# Patient Record
Sex: FEMALE | Race: WHITE | Employment: UNEMPLOYED | URBAN - METROPOLITAN AREA
[De-identification: names, ages, dates, MRNs, and addresses within clinical notes are randomized per-mention and may not be internally consistent; named-entity substitution may affect disease eponyms.]

---

## 2018-08-13 ENCOUNTER — HOSPITAL ENCOUNTER (OUTPATIENT)
Age: 53
Discharge: HOME | End: 2018-08-13

## 2018-08-13 ENCOUNTER — OFFICE VISIT (OUTPATIENT)
Dept: FAMILY MEDICINE CLINIC | Age: 53
End: 2018-08-13

## 2018-08-13 VITALS
TEMPERATURE: 98.4 F | DIASTOLIC BLOOD PRESSURE: 81 MMHG | SYSTOLIC BLOOD PRESSURE: 126 MMHG | HEART RATE: 68 BPM | BODY MASS INDEX: 24.59 KG/M2 | RESPIRATION RATE: 16 BRPM | HEIGHT: 66 IN | WEIGHT: 153 LBS | OXYGEN SATURATION: 99 %

## 2018-08-13 DIAGNOSIS — M79.672 LEFT FOOT PAIN: Primary | ICD-10-CM

## 2018-08-13 DIAGNOSIS — M79.672 LEFT FOOT PAIN: ICD-10-CM

## 2018-08-13 LAB
CHOLESTEROL, TOTAL: 220 MG/DL (ref 0–199)
HBA1C MFR BLD: 6.4 % (ref 4–5.6)
HDLC SERPL-MCNC: 64 MG/DL
LDL CHOLESTEROL CALCULATED: 141 MG/DL (ref 0–99)
TRIGL SERPL-MCNC: 76 MG/DL (ref 0–149)
TSH SERPL DL<=0.05 MIU/L-ACNC: 3.01 UIU/ML (ref 0.27–4.2)
URIC ACID, SERUM: 5.5 MG/DL (ref 2.4–5.7)
VLDLC SERPL CALC-MCNC: 15 MG/DL

## 2018-08-13 PROCEDURE — 80061 LIPID PANEL: CPT

## 2018-08-13 PROCEDURE — 99213 OFFICE O/P EST LOW 20 MIN: CPT | Performed by: FAMILY MEDICINE

## 2018-08-13 PROCEDURE — 84550 ASSAY OF BLOOD/URIC ACID: CPT

## 2018-08-13 PROCEDURE — 36415 COLL VENOUS BLD VENIPUNCTURE: CPT

## 2018-08-13 PROCEDURE — 84443 ASSAY THYROID STIM HORMONE: CPT

## 2018-08-13 PROCEDURE — 83036 HEMOGLOBIN GLYCOSYLATED A1C: CPT

## 2018-08-13 ASSESSMENT — ENCOUNTER SYMPTOMS
HEARTBURN: 1
BLURRED VISION: 0
CONSTIPATION: 0
COUGH: 0
ABDOMINAL PAIN: 0
DIARRHEA: 0
SORE THROAT: 0
NAUSEA: 0

## 2018-08-13 ASSESSMENT — PATIENT HEALTH QUESTIONNAIRE - PHQ9
SUM OF ALL RESPONSES TO PHQ QUESTIONS 1-9: 0
2. FEELING DOWN, DEPRESSED OR HOPELESS: 0
SUM OF ALL RESPONSES TO PHQ QUESTIONS 1-9: 0
SUM OF ALL RESPONSES TO PHQ QUESTIONS 1-9: 0
SUM OF ALL RESPONSES TO PHQ9 QUESTIONS 1 & 2: 0
2. FEELING DOWN, DEPRESSED OR HOPELESS: 0
1. LITTLE INTEREST OR PLEASURE IN DOING THINGS: 0
SUM OF ALL RESPONSES TO PHQ QUESTIONS 1-9: 0
1. LITTLE INTEREST OR PLEASURE IN DOING THINGS: 0
SUM OF ALL RESPONSES TO PHQ9 QUESTIONS 1 & 2: 0

## 2018-08-13 NOTE — PROGRESS NOTES
SUBJECTIVE:        Patient ID: Bill Duron is a 48 y.o. female who presents for   Chief Complaint   Patient presents with    Annual Exam    Foot Pain     lt foot      Annual Physical  Currently does not have insurance   Came from [place], 4 years ago and never has had any insurance  Unable to do any blood work or procedures Due to financial restrictions  FHx of DM in mother and her family and would like to have her A1c checked today  Also HTN in her dad and his family  Wants to get cholesterol checked as as a history of high cholesterol earlier in life    Left Foot Pain  Left foot 1 st MTP joint   Patient reported that she noticed swelling about 2 months ago which was the 1st time she had a possible gout attack, she stopped eating meat and drank lots of water which helped with reducing the swelling. She has been swelling and pain free for the last 2 weeks  FHx of Gout in father  Denies any fever or chills    Review of Systems   Constitutional: Negative for chills and fever. HENT: Positive for ear pain. Negative for congestion and sore throat. Eyes: Negative for blurred vision. Respiratory: Negative for cough. Gastrointestinal: Positive for heartburn. Negative for abdominal pain, constipation, diarrhea and nausea. Genitourinary: Negative for dysuria and frequency. Neurological: Negative for dizziness and headaches. Psychiatric/Behavioral: Negative for depression, substance abuse and suicidal ideas. The patient is not nervous/anxious. The patient has no complaints, no CP, SOB, N/V/D, headache, dizziness, or vision change. Past Medical History:   Diagnosis Date    Acid reflux     Over 5 years ago       Patient Active Problem List   Diagnosis    Epigastric pain    Screening for breast cancer       No past surgical history on file.     Past Medical History:   Diagnosis Date    Acid reflux     Over 5 years ago       Family History   Problem Relation Age of Onset    No Known schedule as soon as possible if overdue, as this is important in assessing for undiagnosed pathology, especially cancer, as well as protecting against potentially harmful/life threatening disease. Refills as needed    Discussed any signs and symptoms that would warrant immediate ED evaluation    Allyssa was instructed to call if any new symptoms develop prior to next visit.          F/U as instructed    Baron Delong M.D  PGY-3  Family Medicine

## 2018-08-13 NOTE — PROGRESS NOTES
[facility]  Precepting Note    Subjective: Annual check up  Has +FHx DM, HgbA1C- wants checked  ?gout attack- L great toe was swollen; improved with hydration; wanted checked for gout  Has no insurance - looking for cost-effective ways to cover care and HM      ROS otherwise per resident note     Past medical, surgical, family and social history were reviewed, non-contributory, and unchanged unless otherwise stated. Objective:    /81   Pulse 68   Temp 98.4 °F (36.9 °C)   Resp 16   Ht 5' 6\" (1.676 m)   Wt 153 lb (69.4 kg)   SpO2 99%   BMI 24.69 kg/m²     Exam is as noted by resident with the following changes, additions or corrections:    General:  NAD; alert & oriented x 3   Neck Thyroid enlarged   Heart:  RRR, no murmurs, gallops, or rubs. Lungs:  CTA bilaterally, no wheeze, rales or rhonchi  Abd: bowel sounds present, nontender, nondistended, no masses  Extrem:  No clubbing, cyanosis, or edema    Assessment/Plan:  Gout  Annual check    Plan  Labs  Colorectal screening with 3 hemoccult cards  RTO for well female within 6 mo     Attending Physician Statement  I have reviewed the chart, including any radiology or labs. I have discussed the case, including pertinent history and exam findings with the resident. I agree with the assessment, plan and orders as documented by the resident. Please refer to the resident note for additional information.       Electronically signed by Darene Dance, MD on 8/13/2018 at 9:41 AM

## 2018-08-14 ENCOUNTER — TELEPHONE (OUTPATIENT)
Dept: FAMILY MEDICINE CLINIC | Age: 53
End: 2018-08-14

## 2018-08-14 DIAGNOSIS — R73.03 PREDIABETES: ICD-10-CM

## 2018-08-14 NOTE — TELEPHONE ENCOUNTER
The 10-year ASCVD risk score (Loulou Hernandez, et al., 2013) is: 1.5%    Values used to calculate the score:      Age: 48 years      Sex: Female      Is Non- : No      Diabetic: No      Tobacco smoker: No      Systolic Blood Pressure: 867 mmHg      Is BP treated: No      HDL Cholesterol: 64 mg/dL      Total Cholesterol: 220 mg/dL'    Spoke with pt regarding borderline A1c and Lipids  Advised that her risk for heart disease is low  But she has borderline A1c and tot Chol and LDL  She needs to watch her diet, eat more lean meats like chicken and turkey, more vegetables and fruits  Avoid eating out and fried foods  Will recheck in 6 months   Pt demonstrated understanding and agreeable to plan

## 2018-11-21 ENCOUNTER — TELEPHONE (OUTPATIENT)
Dept: FAMILY MEDICINE CLINIC | Age: 53
End: 2018-11-21

## 2018-11-21 DIAGNOSIS — R73.03 PREDIABETES: Primary | ICD-10-CM

## 2018-11-21 DIAGNOSIS — E78.00 ELEVATED CHOLESTEROL: ICD-10-CM

## 2018-11-25 PROBLEM — E78.00 ELEVATED CHOLESTEROL: Status: ACTIVE | Noted: 2018-11-25

## 2019-01-29 ENCOUNTER — HOSPITAL ENCOUNTER (OUTPATIENT)
Age: 54
Discharge: HOME | End: 2019-01-29

## 2019-01-29 DIAGNOSIS — E78.00 ELEVATED CHOLESTEROL: ICD-10-CM

## 2019-01-29 LAB
CHOLESTEROL, TOTAL: 186 MG/DL (ref 0–199)
HDLC SERPL-MCNC: 60 MG/DL
LDL CHOLESTEROL CALCULATED: 115 MG/DL (ref 0–99)
TRIGL SERPL-MCNC: 53 MG/DL (ref 0–149)
VLDLC SERPL CALC-MCNC: 11 MG/DL

## 2019-01-29 PROCEDURE — 80061 LIPID PANEL: CPT

## 2019-01-29 PROCEDURE — 36415 COLL VENOUS BLD VENIPUNCTURE: CPT

## 2019-02-08 ENCOUNTER — OFFICE VISIT (OUTPATIENT)
Dept: FAMILY MEDICINE CLINIC | Age: 54
End: 2019-02-08

## 2019-02-08 ENCOUNTER — HOSPITAL ENCOUNTER (OUTPATIENT)
Age: 54
Discharge: HOME | End: 2019-02-10

## 2019-02-08 VITALS
OXYGEN SATURATION: 98 % | BODY MASS INDEX: 24.59 KG/M2 | HEIGHT: 66 IN | HEART RATE: 73 BPM | DIASTOLIC BLOOD PRESSURE: 72 MMHG | SYSTOLIC BLOOD PRESSURE: 117 MMHG | RESPIRATION RATE: 18 BRPM | WEIGHT: 153 LBS

## 2019-02-08 DIAGNOSIS — Z01.419 WELL WOMAN EXAM: Primary | ICD-10-CM

## 2019-02-08 DIAGNOSIS — Z12.39 BREAST CANCER SCREENING: ICD-10-CM

## 2019-02-08 DIAGNOSIS — R73.03 PREDIABETES: ICD-10-CM

## 2019-02-08 LAB — HBA1C MFR BLD: 5.9 %

## 2019-02-08 PROCEDURE — 99396 PREV VISIT EST AGE 40-64: CPT | Performed by: FAMILY MEDICINE

## 2019-02-08 PROCEDURE — 83036 HEMOGLOBIN GLYCOSYLATED A1C: CPT | Performed by: FAMILY MEDICINE

## 2019-02-08 PROCEDURE — 88175 CYTOPATH C/V AUTO FLUID REDO: CPT

## 2019-02-08 PROCEDURE — 87624 HPV HI-RISK TYP POOLED RSLT: CPT

## 2019-02-08 ASSESSMENT — PATIENT HEALTH QUESTIONNAIRE - PHQ9
1. LITTLE INTEREST OR PLEASURE IN DOING THINGS: 0
SUM OF ALL RESPONSES TO PHQ QUESTIONS 1-9: 0
SUM OF ALL RESPONSES TO PHQ QUESTIONS 1-9: 0
2. FEELING DOWN, DEPRESSED OR HOPELESS: 0
SUM OF ALL RESPONSES TO PHQ9 QUESTIONS 1 & 2: 0

## 2019-02-14 LAB
CORRESPONDING PAP CASE #: NORMAL
HPV, HIGH RISK: NEGATIVE

## 2019-09-13 ENCOUNTER — TELEPHONE (OUTPATIENT)
Dept: ADMINISTRATIVE | Age: 54
End: 2019-09-13

## 2019-09-19 ENCOUNTER — TELEPHONE (OUTPATIENT)
Dept: ADMINISTRATIVE | Age: 54
End: 2019-09-19

## 2019-09-24 ENCOUNTER — OFFICE VISIT (OUTPATIENT)
Dept: FAMILY MEDICINE CLINIC | Age: 54
End: 2019-09-24

## 2019-09-24 VITALS
DIASTOLIC BLOOD PRESSURE: 80 MMHG | RESPIRATION RATE: 16 BRPM | SYSTOLIC BLOOD PRESSURE: 137 MMHG | HEART RATE: 71 BPM | BODY MASS INDEX: 22.5 KG/M2 | HEIGHT: 66 IN | WEIGHT: 140 LBS

## 2019-09-24 DIAGNOSIS — R73.03 PREDIABETES: Primary | ICD-10-CM

## 2019-09-24 DIAGNOSIS — Z12.11 COLON CANCER SCREENING: ICD-10-CM

## 2019-09-24 DIAGNOSIS — E78.00 HYPERCHOLESTEREMIA: ICD-10-CM

## 2019-09-24 DIAGNOSIS — Z12.39 BREAST CANCER SCREENING: ICD-10-CM

## 2019-09-24 LAB — HBA1C MFR BLD: 6.1 %

## 2019-09-24 PROCEDURE — 99213 OFFICE O/P EST LOW 20 MIN: CPT | Performed by: FAMILY MEDICINE

## 2019-09-24 PROCEDURE — 83036 HEMOGLOBIN GLYCOSYLATED A1C: CPT | Performed by: FAMILY MEDICINE

## 2020-07-08 ENCOUNTER — HOSPITAL ENCOUNTER (OUTPATIENT)
Age: 55
Discharge: HOME | End: 2020-07-10

## 2020-07-08 LAB
CHOLESTEROL, TOTAL: 196 MG/DL (ref 0–199)
HDLC SERPL-MCNC: 68 MG/DL
LDL CHOLESTEROL CALCULATED: 117 MG/DL (ref 0–99)
TRIGL SERPL-MCNC: 55 MG/DL (ref 0–149)
VLDLC SERPL CALC-MCNC: 11 MG/DL

## 2020-07-08 PROCEDURE — 80061 LIPID PANEL: CPT

## 2020-09-10 ENCOUNTER — OFFICE VISIT (OUTPATIENT)
Dept: FAMILY MEDICINE CLINIC | Age: 55
End: 2020-09-10

## 2020-09-10 ENCOUNTER — HOSPITAL ENCOUNTER (OUTPATIENT)
Age: 55
Discharge: HOME | End: 2020-09-12

## 2020-09-10 VITALS
RESPIRATION RATE: 16 BRPM | HEART RATE: 74 BPM | BODY MASS INDEX: 22.66 KG/M2 | HEIGHT: 66 IN | WEIGHT: 141 LBS | OXYGEN SATURATION: 97 % | TEMPERATURE: 97.8 F | SYSTOLIC BLOOD PRESSURE: 128 MMHG | DIASTOLIC BLOOD PRESSURE: 68 MMHG

## 2020-09-10 PROBLEM — K21.9 GASTROESOPHAGEAL REFLUX DISEASE: Status: ACTIVE | Noted: 2020-09-10

## 2020-09-10 PROBLEM — M25.512 SHOULDER PAIN, LEFT: Status: ACTIVE | Noted: 2020-07-01

## 2020-09-10 PROBLEM — M79.676 PAIN OF GREAT TOE: Status: ACTIVE | Noted: 2020-09-10

## 2020-09-10 PROBLEM — R73.02 IMPAIRED GLUCOSE TOLERANCE: Status: ACTIVE | Noted: 2020-09-10

## 2020-09-10 PROBLEM — M25.512 LEFT SHOULDER PAIN: Status: ACTIVE | Noted: 2020-09-10

## 2020-09-10 LAB — HBA1C MFR BLD: 6 %

## 2020-09-10 PROCEDURE — 85025 COMPLETE CBC W/AUTO DIFF WBC: CPT

## 2020-09-10 PROCEDURE — 99214 OFFICE O/P EST MOD 30 MIN: CPT | Performed by: FAMILY MEDICINE

## 2020-09-10 PROCEDURE — 83036 HEMOGLOBIN GLYCOSYLATED A1C: CPT | Performed by: FAMILY MEDICINE

## 2020-09-10 PROCEDURE — 84550 ASSAY OF BLOOD/URIC ACID: CPT

## 2020-09-10 RX ORDER — IBUPROFEN 200 MG
200 TABLET ORAL EVERY 6 HOURS PRN
Qty: 30 TABLET | Refills: 1 | Status: SHIPPED
Start: 2020-09-10 | End: 2022-08-10

## 2020-09-10 RX ORDER — CIMETIDINE 800 MG
800 TABLET ORAL PRN
Qty: 30 TABLET | Refills: 1 | Status: SHIPPED
Start: 2020-09-10 | End: 2022-05-12

## 2020-09-10 RX ORDER — COLCHICINE 0.6 MG/1
TABLET ORAL
Qty: 9 TABLET | Refills: 1 | Status: SHIPPED
Start: 2020-09-10 | End: 2022-05-12

## 2020-09-10 ASSESSMENT — ENCOUNTER SYMPTOMS
DIARRHEA: 0
SORE THROAT: 0
ABDOMINAL PAIN: 0
VOMITING: 0
SHORTNESS OF BREATH: 0
NAUSEA: 0
CONSTIPATION: 0
EYE PAIN: 0
WHEEZING: 0
BLOOD IN STOOL: 0
TROUBLE SWALLOWING: 0

## 2020-09-10 ASSESSMENT — PATIENT HEALTH QUESTIONNAIRE - PHQ9
1. LITTLE INTEREST OR PLEASURE IN DOING THINGS: 0
SUM OF ALL RESPONSES TO PHQ QUESTIONS 1-9: 0
SUM OF ALL RESPONSES TO PHQ9 QUESTIONS 1 & 2: 0
2. FEELING DOWN, DEPRESSED OR HOPELESS: 0
SUM OF ALL RESPONSES TO PHQ QUESTIONS 1-9: 0

## 2020-09-10 NOTE — PROGRESS NOTES
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 Objective:    /68   Pulse 74   Temp 97.8 °F (36.6 °C) (Temporal)   Resp 16   Ht 5' 6\" (1.676 m)   Wt 141 lb (64 kg)   SpO2 97%   BMI 22.76 kg/m²     Exam is as noted by resident with the following changes, additions or corrections:    General:  NAD; alert & oriented x 3   Heart:  RRR, no murmurs, gallops, or rubs. Lungs:  CTA bilaterally, no wheeze, rales or rhonchi  Abd: bowel sounds present, nontender, nondistended, no masses  Extrem:  No clubbing, cyanosis, or edema  MS: L shoulder with intact ROM, no impingement     Assessment/Plan:    GERD   H2 blocker   IGT   A1C remains acceptable   Work on diet  Toe pain   Uric acid     Shoulder pain   Stretching exercises given       Attending Physician Statement  I have reviewed the chart, including any radiology or labs, and have seen the patient with the resident(s). I personally reviewed and performed key elements of the history and exam.  I agree with the assessment, plan and orders as documented by the resident. Please refer to the resident note for additional information.       Electronically signed by Keturah Parkinson MD on 9/10/2020 at 2:40 PM

## 2020-09-10 NOTE — PROGRESS NOTES
[facility]  Department of Family Medicine  Family Medicine Residency Program      Patient:  Madison Sheffield 54 y.o. female     Date of Service: 9/10/20      Chief complaint:   Chief Complaint   Patient presents with    [facility]    Diabetes     Prediabetes          History ofPresent Illness   Madison Sheffield is a 54 y.o. female who presents to the clinic with complaints as above. Impaired Glucose Tolerance  She has had it for several years  Her last A1C was 6.1 in 2019  Patient would like to have her A1C: today it was 6.0    Great toe pain  Family history of gout in her father  Patient has pain in her left big toe and it's joint every so often, most recently about a week ago  Her right great toe joint sometimes hurts too  The pain lasts about a day or two then goes away  The pain is achy  Her joint swells up and turns red, is very painful to move  Nothing makes it better  Eating a lot of meat makes it worse  Her last uric acid was 5.5; will repeat today    GERD  She has pain in her stomach and under her sternum a few times a week  This has been happening for many years  It hurts for a few minutes to hours and goes away  It is a burning pain  She is unsure of what makes it worse  Tums makes it better  She would like to try a PRN medication for heartburn     Shoulder Pain  For the past two months  Her left shoulder hurts  She is right handed  It hurts for a few hours every few days  It is an achy pain  It is worse when she works a lot at [employer]  She sleeps on her left side  Empty can sign negative  No pain with ROM and no point tenderness  Recommended rest, ibuprofen, gave shoulder exercises; if pain persists, will work up for arthritis    Past Medical History:      Diagnosis Date    Acid reflux     Over 5 years ago    Prediabetes 8/14/2018       Past Surgical History:    No past surgical history on file. Allergies:    Patient has no known allergies.     Social History: Social History     Socioeconomic History    Marital status:      Spouse name: Not on file    Number of children: Not on file    Years of education: Not on file    Highest education level: Not on file   Occupational History    Not on file   Social Needs    Financial resource strain: Not on file    Food insecurity     Worry: Not on file     Inability: Not on file    Transportation needs     Medical: Not on file     Non-medical: Not on file   Tobacco Use    Smoking status: Never Smoker    Smokeless tobacco: Never Used   Substance and Sexual Activity    Alcohol use: No    Drug use: No    Sexual activity: Yes     Partners: Male   Lifestyle    Physical activity     Days per week: Not on file     Minutes per session: Not on file    Stress: Not on file   Relationships    Social connections     Talks on phone: Not on file     Gets together: Not on file     Attends Sabianist service: Not on file     Active member of club or organization: Not on file     Attends meetings of clubs or organizations: Not on file     Relationship status: Not on file    Intimate partner violence     Fear of current or ex partner: Not on file     Emotionally abused: Not on file     Physically abused: Not on file     Forced sexual activity: Not on file   Other Topics Concern    Not on file   Social History Narrative    Not on file        Family History:       Problem Relation Age of Onset    No Known Problems Mother     No Known Problems Father        Medication List:    Current Outpatient Medications   Medication Sig Dispense Refill    colchicine (COLCRYS) 0.6 MG tablet Take two tablets by mouth, then take one tablet by mouth 2 hours after first two tablets. Limit intake to three tablets per episode of great toe pain.  9 tablet 1    cimetidine (TAGAMET) 800 MG tablet Take 1 tablet by mouth as needed (heartburn) 30 tablet 1    ibuprofen (ADVIL) 200 MG tablet Take 1 tablet by mouth every 6 hours as needed for Pain 30 tablet 1     No current facility-administered medications for this visit. Review of Systems:   Review of Systems   Constitutional: Negative for chills and fatigue. HENT: Negative for congestion, sore throat and trouble swallowing. Eyes: Negative for pain and visual disturbance. Respiratory: Negative for shortness of breath and wheezing. Cardiovascular: Negative for chest pain and leg swelling. Gastrointestinal: Negative for abdominal pain, blood in stool, constipation, diarrhea, nausea and vomiting. Genitourinary: Negative for dysuria and hematuria. Musculoskeletal: Positive for arthralgias (per HPI, also mildly in bilateral elbows and fingers). Negative for myalgias. Neurological: Positive for dizziness (if she doesn't sleep well). Negative for headaches. Psychiatric/Behavioral: Negative for dysphoric mood. The patient is not nervous/anxious. Physical Exam   Vitals: /68   Pulse 74   Temp 97.8 °F (36.6 °C) (Temporal)   Resp 16   Ht 5' 6\" (1.676 m)   Wt 141 lb (64 kg)   SpO2 97%   BMI 22.76 kg/m²   Physical Exam  Constitutional:       General: She is not in acute distress. Appearance: Normal appearance. She is normal weight. HENT:      Right Ear: External ear normal.      Left Ear: External ear normal.   Eyes:      General: No scleral icterus. Right eye: No discharge. Left eye: No discharge. Neck:      Musculoskeletal: Normal range of motion. No neck rigidity. Cardiovascular:      Rate and Rhythm: Normal rate and regular rhythm. Heart sounds: Normal heart sounds. No murmur. Pulmonary:      Effort: Pulmonary effort is normal.      Breath sounds: Normal breath sounds. No wheezing. Abdominal:      General: Abdomen is flat. Bowel sounds are normal.      Palpations: Abdomen is soft. Tenderness: There is no abdominal tenderness. Musculoskeletal: Normal range of motion. General: No tenderness. Right lower leg: No edema. Left lower leg: No edema. Comments: Small area of redness without increased warmth or pain on medial aspect of left great toe joint   Skin:     General: Skin is warm and dry. Comments: Vitiligo noted on bilateral arms, legs, left foot, face, and base of anterior neck   Neurological:      General: No focal deficit present. Mental Status: She is alert and oriented to person, place, and time. Motor: No weakness. Assessment and Plan     1. Pain of great toe, unspecified laterality  Left greater than right  Last flare was one week ago, swelling/redness/pain  Uric acid drawn today, patient given standing order to return during a flare  Colchicine prescription given today for treatment during flare, will start preventative medication if she has another flare this year or today's uric acid is elevated  - URIC ACID; Future  - colchicine (COLCRYS) 0.6 MG tablet; Take two tablets by mouth, then take one tablet by mouth 2 hours after first two tablets. Limit intake to three tablets per episode of great toe pain. Dispense: 9 tablet; Refill: 1  - URIC ACID; Standing    2. Impaired glucose tolerance  A1C in 2019 was 6.1, today was 6.0  Will start medication and change diagnosis if A1C elevates in future  Patient educated on diet and exercise today  - CBC WITH AUTO DIFFERENTIAL; Future    3. Gastroesophageal reflux disease, esophagitis presence not specified  Several times per week  Does not want to take a medication every day for her heartburn  Will try PRN medication  Educated on trigger foods  CBC to check for possible complications, low clinical suspicion  - cimetidine (TAGAMET) 800 MG tablet; Take 1 tablet by mouth as needed (heartburn)  Dispense: 30 tablet; Refill: 1  - CBC WITH AUTO DIFFERENTIAL; Future    4.  Left shoulder pain, unspecified chronicity  Two months, achy, sleeps on her left  No point tenderness or pain with ROM, empty can test negative  Recommended rest, ibuprofen PRN, gave

## 2020-09-10 NOTE — PATIENT INSTRUCTIONS
Patient Education        Shoulder Arthritis: Exercises  Introduction  Here are some examples of exercises for you to try. The exercises may be suggested for a condition or for rehabilitation. Start each exercise slowly. Ease off the exercises if you start to have pain. You will be told when to start these exercises and which ones will work best for you. How to do the exercises  Shoulder flexion (lying down)   To make a wand for this exercise, use a piece of PVC pipe or a broom handle with the broom removed. Make the wand about a foot wider than your shoulders. 1. Lie on your back, holding a wand with both hands. Your palms should face down as you hold the wand. 2. Keeping your elbows straight, slowly raise your arms over your head. Raise them until you feel a stretch in your shoulders, upper back, and chest.  3. Hold for 15 to 30 seconds. 4. Repeat 2 to 4 times. Shoulder rotation (lying down)   To make a wand for this exercise, use a piece of PVC pipe or a broom handle with the broom removed. Make the wand about a foot wider than your shoulders. 1. Lie on your back. Hold a wand with both hands with your elbows bent and palms up. 2. Keep your elbows close to your body, and move the wand across your body toward the sore arm. 3. Hold for 8 to 12 seconds. 4. Repeat 2 to 4 times. Shoulder internal rotation with towel   1. Hold a towel above and behind your head with the arm that is not sore. 2. With your sore arm, reach behind your back and grasp the towel. 3. With the arm above your head, pull the towel upward. Do this until you feel a stretch on the front and outside of your sore shoulder. 4. Hold 15 to 30 seconds. 5. Repeat 2 to 4 times. Shoulder blade squeeze   1. Stand with your arms at your sides, and squeeze your shoulder blades together. Do not raise your shoulders up as you squeeze. 2. Hold 6 seconds. 3. Repeat 8 to 12 times.     Resisted rows   For this exercise, you will need elastic exercise material, such as surgical tubing or Thera-Band. 1. Put the band around a solid object at about waist level. (A bedpost will work well.) Each hand should hold an end of the band. 2. With your elbows at your sides and bent to 90 degrees, pull the band back. Your shoulder blades should move toward each other. Return to the starting position. 3. Repeat 8 to 12 times. External rotator strengthening exercise   1. Start by tying a piece of elastic exercise material to a doorknob. You can use surgical tubing or Thera-Band. (You may also hold one end of the band in each hand.)  2. Stand or sit with your shoulder relaxed and your elbow bent 90 degrees. Your upper arm should rest comfortably against your side. Squeeze a rolled towel between your elbow and your body for comfort. This will help keep your arm at your side. 3. Hold one end of the elastic band with the hand of the painful arm. 4. Start with your forearm across your belly. Slowly rotate the forearm out away from your body. Keep your elbow and upper arm tucked against the towel roll or the side of your body until you begin to feel tightness in your shoulder. Slowly move your arm back to where you started. 5. Repeat 8 to 12 times. Internal rotator strengthening exercise   1. Start by tying a piece of elastic exercise material to a doorknob. You can use surgical tubing or Thera-Band. 2. Stand or sit with your shoulder relaxed and your elbow bent 90 degrees. Your upper arm should rest comfortably against your side. Squeeze a rolled towel between your elbow and your body for comfort. This will help keep your arm at your side. 3. Hold one end of the elastic band in the hand of the painful arm. 4. Slowly rotate your forearm toward your body until it touches your belly. Slowly move it back to where you started. 5. Keep your elbow and upper arm firmly tucked against the towel roll or at your side. 6. Repeat 8 to 12 times.     Pendulum swing   If you have pain in your back, do not do this exercise. 1. Hold on to a table or the back of a chair with your good arm. Then bend forward a little and let your sore arm hang straight down. This exercise does not use the arm muscles. Rather, use your legs and your hips to create movement that makes your arm swing freely. 2. Use the movement from your hips and legs to guide the slightly swinging arm back and forth like a pendulum (or elephant trunk). Then guide it in circles that start small (about the size of a dinner plate). Make the circles a bit larger each day, as your pain allows. 3. Do this exercise for 5 minutes, 5 to 7 times each day. 4. As you have less pain, try bending over a little farther to do this exercise. This will increase the amount of movement at your shoulder. Follow-up care is a key part of your treatment and safety. Be sure to make and go to all appointments, and call your doctor if you are having problems. It's also a good idea to know your test results and keep a list of the medicines you take. Where can you learn more? Go to https://[employer]. org and sign in to your [employer] account. Enter H562 in the [employer] box to learn more about \"Shoulder Arthritis: Exercises. \"     If you do not have an account, please click on the \"Sign Up Now\" link. Current as of: March 2, 2020               Content Version: 12.5  © 1057-4199 [employer], [employer]. Care instructions adapted under license by [facility] ([facility]). If you have questions about a medical condition or this instruction, always ask your healthcare professional. [place] [address] any warranty or liability for your use of this information. Purse String (Intermediate) Text: Given the location of the defect and the characteristics of the surrounding skin a purse string intermediate closure was deemed most appropriate.  Undermining was performed circumfirentially around the surgical defect.  A purse string suture was then placed and tightened.

## 2020-09-11 LAB
BASOPHILS ABSOLUTE: 0.03 E9/L (ref 0–0.2)
BASOPHILS RELATIVE PERCENT: 0.9 % (ref 0–2)
EOSINOPHILS ABSOLUTE: 0.05 E9/L (ref 0.05–0.5)
EOSINOPHILS RELATIVE PERCENT: 1.4 % (ref 0–6)
HCT VFR BLD CALC: 42.1 % (ref 34–48)
HEMOGLOBIN: 13.3 G/DL (ref 11.5–15.5)
IMMATURE GRANULOCYTES #: 0.01 E9/L
IMMATURE GRANULOCYTES %: 0.3 % (ref 0–5)
LYMPHOCYTES ABSOLUTE: 1.36 E9/L (ref 1.5–4)
LYMPHOCYTES RELATIVE PERCENT: 39.2 % (ref 20–42)
MCH RBC QN AUTO: 28.6 PG (ref 26–35)
MCHC RBC AUTO-ENTMCNC: 31.6 % (ref 32–34.5)
MCV RBC AUTO: 90.5 FL (ref 80–99.9)
MONOCYTES ABSOLUTE: 0.28 E9/L (ref 0.1–0.95)
MONOCYTES RELATIVE PERCENT: 8.1 % (ref 2–12)
NEUTROPHILS ABSOLUTE: 1.74 E9/L (ref 1.8–7.3)
NEUTROPHILS RELATIVE PERCENT: 50.1 % (ref 43–80)
PDW BLD-RTO: 13.2 FL (ref 11.5–15)
PLATELET # BLD: 199 E9/L (ref 130–450)
PMV BLD AUTO: 12.3 FL (ref 7–12)
RBC # BLD: 4.65 E12/L (ref 3.5–5.5)
URIC ACID, SERUM: 4.5 MG/DL (ref 2.4–5.7)
WBC # BLD: 3.5 E9/L (ref 4.5–11.5)

## 2021-11-16 ENCOUNTER — OFFICE VISIT (OUTPATIENT)
Dept: FAMILY MEDICINE CLINIC | Age: 56
End: 2021-11-16

## 2021-11-16 VITALS
TEMPERATURE: 97.5 F | WEIGHT: 143.6 LBS | HEIGHT: 66 IN | BODY MASS INDEX: 23.08 KG/M2 | HEART RATE: 71 BPM | OXYGEN SATURATION: 99 % | DIASTOLIC BLOOD PRESSURE: 80 MMHG | SYSTOLIC BLOOD PRESSURE: 133 MMHG

## 2021-11-16 DIAGNOSIS — R73.03 PREDIABETES: ICD-10-CM

## 2021-11-16 DIAGNOSIS — M25.512 CHRONIC LEFT SHOULDER PAIN: ICD-10-CM

## 2021-11-16 DIAGNOSIS — G44.209 ACUTE NON INTRACTABLE TENSION-TYPE HEADACHE: Primary | ICD-10-CM

## 2021-11-16 DIAGNOSIS — H11.32 SUBCONJUNCTIVAL HEMATOMA, LEFT: ICD-10-CM

## 2021-11-16 DIAGNOSIS — G89.29 CHRONIC LEFT SHOULDER PAIN: ICD-10-CM

## 2021-11-16 LAB
ANION GAP SERPL CALCULATED.3IONS-SCNC: 17 MMOL/L (ref 7–16)
BASOPHILS ABSOLUTE: 0.04 E9/L (ref 0–0.2)
BASOPHILS RELATIVE PERCENT: 0.9 % (ref 0–2)
BUN BLDV-MCNC: 13 MG/DL (ref 6–20)
CALCIUM SERPL-MCNC: 10 MG/DL (ref 8.6–10.2)
CHLORIDE BLD-SCNC: 105 MMOL/L (ref 98–107)
CHOLESTEROL, TOTAL: 237 MG/DL (ref 0–199)
CO2: 21 MMOL/L (ref 22–29)
CREAT SERPL-MCNC: 0.8 MG/DL (ref 0.5–1)
EOSINOPHILS ABSOLUTE: 0.12 E9/L (ref 0.05–0.5)
EOSINOPHILS RELATIVE PERCENT: 2.7 % (ref 0–6)
GFR AFRICAN AMERICAN: >60
GFR NON-AFRICAN AMERICAN: >60 ML/MIN/1.73
GLUCOSE BLD-MCNC: 118 MG/DL (ref 74–99)
HBA1C MFR BLD: 6.1 % (ref 4–5.6)
HCT VFR BLD CALC: 45.4 % (ref 34–48)
HDLC SERPL-MCNC: 63 MG/DL
HEMOGLOBIN: 14.1 G/DL (ref 11.5–15.5)
IMMATURE GRANULOCYTES #: 0.02 E9/L
IMMATURE GRANULOCYTES %: 0.5 % (ref 0–5)
LDL CHOLESTEROL CALCULATED: 161 MG/DL (ref 0–99)
LYMPHOCYTES ABSOLUTE: 1.33 E9/L (ref 1.5–4)
LYMPHOCYTES RELATIVE PERCENT: 30.2 % (ref 20–42)
MCH RBC QN AUTO: 28.4 PG (ref 26–35)
MCHC RBC AUTO-ENTMCNC: 31.1 % (ref 32–34.5)
MCV RBC AUTO: 91.5 FL (ref 80–99.9)
MONOCYTES ABSOLUTE: 0.28 E9/L (ref 0.1–0.95)
MONOCYTES RELATIVE PERCENT: 6.3 % (ref 2–12)
NEUTROPHILS ABSOLUTE: 2.62 E9/L (ref 1.8–7.3)
NEUTROPHILS RELATIVE PERCENT: 59.4 % (ref 43–80)
PDW BLD-RTO: 13 FL (ref 11.5–15)
PLATELET # BLD: 266 E9/L (ref 130–450)
PMV BLD AUTO: 12.1 FL (ref 7–12)
POTASSIUM SERPL-SCNC: 4.8 MMOL/L (ref 3.5–5)
RBC # BLD: 4.96 E12/L (ref 3.5–5.5)
SODIUM BLD-SCNC: 143 MMOL/L (ref 132–146)
TRIGL SERPL-MCNC: 65 MG/DL (ref 0–149)
VLDLC SERPL CALC-MCNC: 13 MG/DL
WBC # BLD: 4.4 E9/L (ref 4.5–11.5)

## 2021-11-16 PROCEDURE — 99213 OFFICE O/P EST LOW 20 MIN: CPT | Performed by: STUDENT IN AN ORGANIZED HEALTH CARE EDUCATION/TRAINING PROGRAM

## 2021-11-16 ASSESSMENT — ENCOUNTER SYMPTOMS
VOMITING: 0
COUGH: 0
SINUS PAIN: 0
CHEST TIGHTNESS: 0
NAUSEA: 0
SORE THROAT: 0
SHORTNESS OF BREATH: 0
EYE PAIN: 0
EYE REDNESS: 1
ABDOMINAL PAIN: 0
WHEEZING: 0

## 2021-11-16 ASSESSMENT — PATIENT HEALTH QUESTIONNAIRE - PHQ9
SUM OF ALL RESPONSES TO PHQ9 QUESTIONS 1 & 2: 0
1. LITTLE INTEREST OR PLEASURE IN DOING THINGS: 0
SUM OF ALL RESPONSES TO PHQ QUESTIONS 1-9: 0
2. FEELING DOWN, DEPRESSED OR HOPELESS: 0
SUM OF ALL RESPONSES TO PHQ QUESTIONS 1-9: 0
SUM OF ALL RESPONSES TO PHQ QUESTIONS 1-9: 0

## 2021-11-16 NOTE — PROGRESS NOTES
including any radiology or labs, and have seen the patient with the resident(s). I personally reviewed and performed key elements of the history and exam.  I agree with the assessment, plan and orders as documented by the resident. Please refer to the resident note for additional information.       Electronically signed by Cora Dooley MD on 11/16/2021 at 11:00 AM

## 2021-11-16 NOTE — PATIENT INSTRUCTIONS
FOR HEADACHE:  Excedrin or Ibuprofen  Increase water  Frequent meals                Patient Education        Shoulder Arthritis: Exercises  Introduction  Here are some examples of exercises for you to try. The exercises may be suggested for a condition or for rehabilitation. Start each exercise slowly. Ease off the exercises if you start to have pain. You will be told when to start these exercises and which ones will work best for you. How to do the exercises  Shoulder flexion (lying down)    To make a wand for this exercise, use a piece of PVC pipe or a broom handle with the broom removed. Make the wand about a foot wider than your shoulders. 1. Lie on your back, holding a wand with both hands. Your palms should face down as you hold the wand. 2. Keeping your elbows straight, slowly raise your arms over your head. Raise them until you feel a stretch in your shoulders, upper back, and chest.  3. Hold for 15 to 30 seconds. 4. Repeat 2 to 4 times. Shoulder rotation (lying down)    To make a wand for this exercise, use a piece of PVC pipe or a broom handle with the broom removed. Make the wand about a foot wider than your shoulders. 1. Lie on your back. Hold a wand with both hands with your elbows bent and palms up. 2. Keep your elbows close to your body, and move the wand across your body toward the sore arm. 3. Hold for 8 to 12 seconds. 4. Repeat 2 to 4 times. Shoulder internal rotation with towel    1. Hold a towel above and behind your head with the arm that is not sore. 2. With your sore arm, reach behind your back and grasp the towel. 3. With the arm above your head, pull the towel upward. Do this until you feel a stretch on the front and outside of your sore shoulder. 4. Hold 15 to 30 seconds. 5. Repeat 2 to 4 times. Shoulder blade squeeze    1. Stand with your arms at your sides, and squeeze your shoulder blades together. Do not raise your shoulders up as you squeeze.   2. Hold 6 seconds. 3. Repeat 8 to 12 times. Resisted rows    For this exercise, you will need elastic exercise material, such as surgical tubing or Thera-Band. 1. Put the band around a solid object at about waist level. (A bedpost will work well.) Each hand should hold an end of the band. 2. With your elbows at your sides and bent to 90 degrees, pull the band back. Your shoulder blades should move toward each other. Return to the starting position. 3. Repeat 8 to 12 times. External rotator strengthening exercise    1. Start by tying a piece of elastic exercise material to a doorknob. You can use surgical tubing or Thera-Band. (You may also hold one end of the band in each hand.)  2. Stand or sit with your shoulder relaxed and your elbow bent 90 degrees. Your upper arm should rest comfortably against your side. Squeeze a rolled towel between your elbow and your body for comfort. This will help keep your arm at your side. 3. Hold one end of the elastic band with the hand of the painful arm. 4. Start with your forearm across your belly. Slowly rotate the forearm out away from your body. Keep your elbow and upper arm tucked against the towel roll or the side of your body until you begin to feel tightness in your shoulder. Slowly move your arm back to where you started. 5. Repeat 8 to 12 times. Internal rotator strengthening exercise    1. Start by tying a piece of elastic exercise material to a doorknob. You can use surgical tubing or Thera-Band. 2. Stand or sit with your shoulder relaxed and your elbow bent 90 degrees. Your upper arm should rest comfortably against your side. Squeeze a rolled towel between your elbow and your body for comfort. This will help keep your arm at your side. 3. Hold one end of the elastic band in the hand of the painful arm. 4. Slowly rotate your forearm toward your body until it touches your belly. Slowly move it back to where you started.   5. Keep your elbow and upper arm firmly tucked against the towel roll or at your side. 6. Repeat 8 to 12 times. Pendulum swing    If you have pain in your back, do not do this exercise. 1. Hold on to a table or the back of a chair with your good arm. Then bend forward a little and let your sore arm hang straight down. This exercise does not use the arm muscles. Rather, use your legs and your hips to create movement that makes your arm swing freely. 2. Use the movement from your hips and legs to guide the slightly swinging arm back and forth like a pendulum (or elephant trunk). Then guide it in circles that start small (about the size of a dinner plate). Make the circles a bit larger each day, as your pain allows. 3. Do this exercise for 5 minutes, 5 to 7 times each day. 4. As you have less pain, try bending over a little farther to do this exercise. This will increase the amount of movement at your shoulder. Follow-up care is a key part of your treatment and safety. Be sure to make and go to all appointments, and call your doctor if you are having problems. It's also a good idea to know your test results and keep a list of the medicines you take. Where can you learn more? Go to [email or web address]://[email or web address].[email or web address] Piece. org and sign in to your [employer] account. Enter H562 in the VisualOn box to learn more about \"Shoulder Arthritis: Exercises. \"     If you do not have an account, please click on the \"Sign Up Now\" link. Current as of: July 1, 2021               Content Version: 13.0  © 2006-2021 Healthwise, Incorporated. Care instructions adapted under license by [facility] ([place] [facility]). If you have questions about a medical condition or this instruction, always ask your healthcare professional. [place] [address] any warranty or liability for your use of this information. Patient Education       ÇáßÝÉ ÇáãÏæÑÉ: ÊãÇÑíä  Rotator Cuff: Exercises  ÃÑÌÍÉ ÇáÈäÏæá    1. ãáÇÍÙÉ: ÅÐÇ ßÇä áÏíß Ãáã Ýí ÇáÙåÑ¡ ÝáÇ ÊÄÏö åÐÇ ÇáÊãÑíä. 2.  ÃãÓß ÇáßÊÝíä ÃßËÑ¡ ÞÏ ÊÍÊÇÌ Åáì ÇáÊÑÇÌÚ ÎØæÉ Ãæ ÎØæÊíä Åáì ÇáÎáÝ. 4. ÇËÈÊ ãä 15 Åáì 30 ËÇäíÉ Úáì ÇáÃÞá. æÈÚÏ áß ÞÝ æÇÓÊÑÍ. ÅÐÇ ÊÑÇÌÚÊ Åáì ÇáÎáÝ ÃËäÇÁ ZCWTYOM¡ ÝÊÞÏã Åáì ÇáÃãÇã ÍÊì ÊÊãßä ãä ÅÈÞÇÁ ÇáíÏíä Úáì ÇáÓØÍ ÇáÕáÈ. 5. ßÑÑ Ðáß ãÑÊíä Åáì 4 ãÑÇÊ. Ëäí ÇáßÊÝ (ãä æÖÚ ÇáÑÞæÏ)    1. ãáÇÍÙÉ: áÕäÇÚÉ ÚÕÇ ÇáÊãÑíä¡ ÇÓÊÎÏã ÞØÚÉ ãä ÃäÈæÈ ÈáÇÓÊíßí Ãæ ÚÕÇ ÇáãßäÓÉ ÈÚÏ ÅÒÇáÉ ÇáãßäÓÉ. ÇÌÚá ÇáÚÕÇ ÃÚÑÖ ãä ÇáßÊÝíä ÈäÍæ ÞÏã. 2. ÇÓÊáÞ Úáì ÇáÙåÑ¡ æÇÍãá ÇáÚÕÇ ÈíÏíß ßáÊíåãÇ. æíÌÈ Ãä ÊÊÌå YZCHBRYC Åáì NPTGTN ÃËäÇÁ Íãá ÇáÚÕÇ. 3. æÃËäÇÁ ÇáÍÝÇÙ Úáì ÇáãÑÝÞíä Ýí æÖÚ ãÓÊÞíã¡ ÇÑÝÚ ÇáÐÑÇÚíä ÈÈØÁ ÃÚáì ÇáÑÃÓ. NWGLUNRF Åáì Ãä ÊÔÚÑ ÈÇáÔÏø Ýí ÇáßÊÝíä RZMCU ÇáÙåÑ æÇáÕÏÑ. 4. ÇÈÞó åßÐÇ áãÏÉ 15 Åáì 30 ËÇäíÉ. 5. ßÑÑ Ðáß ãÑÊíä Åáì 4 ãÑÇÊ. ÅÏÇÑÉ ÇáßÊÝ (ãä æÖÚ ÇáÑÞæÏ)    1. ãáÇÍÙÉ: áÕäÇÚÉ ÚÕÇ ÇáÊãÑíä¡ ÇÓÊÎÏã ÞØÚÉ ãä ÃäÈæÈ ÈáÇÓÊíßí Ãæ ÚÕÇ ÇáãßäÓÉ ÈÚÏ ÅÒÇáÉ ÇáãßäÓÉ. ÇÌÚá ÇáÚÕÇ ÃÚÑÖ ãä ÇáßÊÝíä ÈäÍæ ÞÏã. 2. ÇÑÞÏ Úáì ÙåÑß. æÇÍãá ÇáÚÕÇ ÈÇáíÏíä ßáÊíåãÇ ãÚ Ëäí ÇáãÑÝÞíä æÇÊÌÇå [id number] Åáì ÇáÃÚáì. 3. ÇÌÚá ÇáãÑÝÞíä ÞÑíÈíä ãä ÇáÌÓã æÍÑøß ÇáÚÕÇ ÈÚÑÖ ÇáÌÓã Ýí ÇÊÌÇå ÇáÐÑÇÚ ÇáãÕÇÈ. 4. ÇÓÊãÑ Ýí åÐÇ ÇáæÖÚ áãÏÉ 8 Åáì 12 ËÇäíÉ. 5. ßÑÑ Ðáß ãÑÊíä Åáì 4 ãÑÇÊ. ÊÓáÞ ÇáÌÏÇÑ (Åáì ÇáÌÇäÈ)    1. ãáÇÍÙÉ: ÊÌäøÈ Ãíø ÍÑßÉ Êßæä ãÓÊÞíãÉ ÈÇáäÓÈÉ áÌÇäÈß¡ æÇäÊÈå Åáì ÚÏã ÊÞæøÓ ÇáÙåÑ. æíÌÈ Ãä íÓÊÞÑ ÇáÐÑÇÚ Úáì 30 ÏÑÌÉ ÈÇáäÓÈÉ áãÞÏãÉ ÇáÌäÈ. 2. ÞÝ ÈÍíË íßæä ÌÇäÈß Ýí ÇÊÌÇå ÇáÍÇÆØ ÈÍíË íãßä ááÃÕÇÈÚ áãÓå Ýí ÒÇæíÉ 30 ÏÑÌÉ ÈÇáäÓÈÉ ááÌÇäÈ ÇáÃãÇãí ãä ÇáÌÓã. 3. ÍÑøß ÃÕÇÈÚ ÇáÐÑÇÚ ÇáãÕÇÈ Åáì ÇáÃÚáì Úáì ÇáÌÏÇÑ ÈÇáÞÏÑ ÇáÐí íÓãÍ Èå ÇáÃáã. ÍÇæá ÚÏã ÊÍÑíß ÇáßÊÝ Åáì ÇáÃÚáì Ýí ÇÊÌÇå ÇáÃÐä ÃËäÇÁ ÊÍÑíß ÇáÐÑÇÚ Åáì ÇáÃÚáì. 4. ÇËÈÊ Ýí åÐÇ ÇáæÖÚ ÍÊì íäÊåí ÇáÚÏø ãä 15 Åáì 20 ÚÏøÉ Úáì ÇáÃÞá. 5. ÍÑøß ÃÕÇÈÚß Åáì ÇáÃÓÝá Åáì ÇáæÖÚ ÇáãÓÊÞíã. 6. ßÑøÑ ÇáÊãÑíä ãä 2 Åáì 4 ãÑÇÊ Úáì ÇáÃÞá. æÍÇæá Ãä ÊÕá Åáì äÞØÉ ÃÚáì Ýí ßá ãÑÉ. ÊÓáÞ ÇáÌÏÇÑ (Åáì ÇáÃãÇã)    1. ãáÇÍÙÉ: ÃËäÇÁ ÊãÑíä ÇáÅØÇáÉ åÐÇ¡ ÇäÊÈå Åáì ÚÏã ÊÞæøÓ ÇáÙåÑ. 2. ÞÝ Ýí ãæÇÌåÉ ÇáÌÏÇÑ¡ æÞÝ ÈÚíÏðÇ Úäå ÈÍíË íãßä áÃÕÇÈÚß ÝÍÓÈ áãÓå.   3. ÇÌÚá ÇáßÊÝ ãÓÊÞÑðÇ Ýí SWRMVC¡ æÍÑøß ÇáãØÇØÉ ÈãÞÈÖ ÇáÈÇÈ. æíãßäß ÇÓÊÎÏÇã ÃäÈæÈ ÇáÌÑÇÍÉ Ãæ ÔÑíØ Thera-Band. 2. ÞÝ Ãæ ÇÌáÓ ãÚ ÇÓÊÑÎÇÁ ÇáßÊÝíä æËäí ÇáãÑÝÞíä Úáì ÒÇæíÉ 90 ÏÑÌÉ. æíäÈÛí ÇÓÊÑÎÇÁ ÇáÚÖÏ ÈÔßá ãÑíÍ ãÞÇÈá ÌÇäÈß. ÇÖÛØ Úáì ãäÔÝÉ ãáÝæÝÉ Èíä ÇáãÑÝÞ æÇáÌÓÏ ááÍÕæá Úáì ÇáÑÇÍÉ. ÝåÐÇ íÌÚá ÇáÐÑÇÚ Ýí ãæÖÚå Úáì ÇáÌÇäÈ. 3. ÃãÓß ÈÃÍÏ ØÑÝí ÇáÔÑíØ ÇáãØÇØ ãÓÊÎÏãðÇ íÏ ÇáÐÑÇÚ ÇáãÕÇÈ. 4. æÈÈØÁ ÃÏÑ ÇáÓÇÚÏ Ýí ÇÊÌÇå ÇáÌÓã Åáì Ãä íáãÓ ÇáÈØä. æÈÚÏ áß ÑÏøå Åáì ÍíË ÈÏÃÊ. 5. ÍÇÝÙ Úáì ÈÞÇÁ ÇáãÑÝÞ æÇáÚÖÏ ËÇÈÊíä IPIIYT Úáì ÇáãäÔÝÉ QTXIGVPV Ãæ ÌÇäÈ ÇáÌÓã. 6. ßÑøÑ ÇáÊãÑíä ãä 8 ãÑÇÊ Åáì 12 ãÑÉ. ÊãÑíä ÇáÊÞæíÉ ÈÇáÏæÑÇä ÇáÎÇÑÌí    1. ÇÈÏÃ ÈÑÈØ ÞØÚÉ ãä ãÇÏÉ ÇáÊãÑíä ÇáãØÇØÉ ÈãÞÈÖ ÇáÈÇÈ. æíãßäß ÇÓÊÎÏÇã ÃäÈæÈ ÇáÌÑÇÍÉ Ãæ ÔÑíØ Thera-Band. (íãßäß ÃíÖðÇ ÅãÓÇß ßá ØÑÝ ãä ØÑÝí ÇáÔÑíØ Ýí ßá íÏ.)  2. ÞÝ Ãæ ÇÌáÓ ãÚ ÇÓÊÑÎÇÁ ÇáßÊÝíä æËäí ÇáãÑÝÞíä Úáì ÒÇæíÉ 90 ÏÑÌÉ. æíäÈÛí ÇÓÊÑÎÇÁ ÇáÚÖÏ ÈÔßá ãÑíÍ ãÞÇÈá ÌÇäÈß. ÇÖÛØ Úáì ãäÔÝÉ ãáÝæÝÉ Èíä ÇáãÑÝÞ æÇáÌÓÏ ááÍÕæá Úáì ÇáÑÇÍÉ. ÝåÐÇ íÌÚá ÇáÐÑÇÚ Ýí ãæÖÚå Úáì ÇáÌÇäÈ. 3. ÃãÓß ÈÃÍÏ ØÑÝí ÇáÔÑíØ ÇáãØÇØ ÈíÏ ÇáÐÑÇÚ ÇáãÕÇÈ. 4. ÇÈÏÃ ÈæÖÚ ÇáÓÇÚÏ ÈÇáÚÑÖ Úáì ÇáÈØä. æÈÈØÁ ÃÏÑ ÇáÓÇÚÏ Åáì ÇáÎÇÑÌ ÈÚíÏðÇ Úä ÇáÌÓã. ÍÇÝÙ Úáì ÈÞÇÁ ÇáãÑÝÞ æÇáÚÖÏ ËÇÈÊðÇ Úáì ãäÔÝÉ ãáÝæÝÉ Ãæ ÌÇäÈ ÇáÌÓã Åáì Ãä ÊÈÏÃ ZZYXSAM ÈÇáÔÏø Ýí ÇáßÊÝ. æÈÚÏ Ðáß ÍÑøß ÐÑÇÚß Åáì ÍíË ÈÏÃÊ. 5. ßÑøÑ ÇáÊãÑíä ãä 8 ãÑÇÊ Åáì 12 ãÑÉ. ÊõÚÏ ÑÚÇíÉ ÇáãÊÇÈÚÉ ÌÒÁðÇ ÃÓÇÓíðÇ Ýí ÚáÇÌß æÓáÇãÊß. ÝÚáíß ÇáÍÑÕ Úáì ÊÑÊíÈ ÌãíÚ ãæÇÚíÏ ÒíÇÑÉ ÇáØÈíÈ ZVSYVNUDE ÈåÇ¡ FVVBIETU ÈØÈíÈß ÚäÏ JVQCTOEF ãä Ãí ãÔßáÇÊ. æãä ÇáÌíÏ ÃíÖðÇ Ãä ÊÚÑÝ äÊÇÆÌ YZMBXVRS æßÐáß SEZNDHYR ÈÞÇÆãÉ ÇáÃÏæíÉ ÇáÊí KFEPHDBS.  Ãíä íãßäß ãÚÑÝÉ ÇáãÒíÏ¿  ÇäÊÞÇá Åáì   [email or web address]  ÃÏÎá Nuris Morita Ýí ãÑÈÚ ÇáÈÍË áãÚÑÝÉ ÇáãÒíÏ Íæá \"ÇáßÝÉ ÇáãÏæÑÉ: ÊãÇÑíä. \"  Deitra Glass [id number] ãä: 1 ÊãæÒ 1558               äÓÎÉ MVVEINR: 13.0  © 3075-1694 Healthwise, Incorporated. Êã ÊÚÏíá ÅÑÔÇÏÇÊ ÇáÑÚÇíÉ ÈãæÌÈ ÊÑÎíÕ ÕÇÏÑ ãä ÇÎÊÕÇÕí ÇáÑÚÇíÉ ÇáÕÍíÉ ÇáÎÇÕ Èß. ÅÐÇ ßÇäÊ áÏíß ÃÓÆáÉ MYWFI ÈÍÇáÉ ãÑÖíÉ Ãæ ÈåÐå ÇáÊÚáíãÇÊ¡ ÝÇÍÑÕ Úáì ÇáÑÌæÚ ÏÇÆãðÇ Åáì ÇÎÊÕÇÕí ÇáÑÚÇíÉ ÇáÕÍíÉ.  ÊõÎáí ÔÑßÉ Gerri Vitale GLYOVQOFJ Jenny Big ÖãÇä Ãæ ÇáÊBalbir Gomez FIFKGTHWE áåÐå [id number].

## 2021-11-16 NOTE — PROGRESS NOTES
[address] [facility]      Department of Family Medicine  Family Medicine Residency  Phone: [phone]   Fax: [phone]    11/16/21    Liam Thakur is a 64 y.o. female presenting to the outpatient clinic for:  Chief Complaint   Patient presents with    Blood Pressure Check     pt states it's been running high.   She is also having headaches everyday however not all day       :Patient refused  x3  Daughter was present during exam  Patient spoke in [place] during the exam as well  Used teach back to assess understanding    HPI:    Headaches  -5 days  -All day but intensity varies  -Top of head  -Feels like tightening on top of head  -Advil twice but did not help  -Makes eyes feel heavy  -No hx migraines  -Denies nausea, vomiting, photophobia, phonophobia  -Denies head injury  -Denies slurred vision or weakness  -Nothing makes better   -Feels it is from BP (no hx htn)  -BP readings from home all WNL    Red eye  -Left eye  -5 days  -Improving over time  -No pain  -No drainage  -Had URI symptoms 1 month ago  -Used OTC eyedrops twice and slightly improved redness  -Denies vision changes  -Denies use of contacts  -Denies injury    Left Shoulder pain  -In just shoulder, no radiation  -With movement  -x6 months  -Comes and goes  -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  -Working exacerbates   -No injury she can remember   -Similar pain occurred with right shoulder that improved with PT    Prediabetes  -Denies any urinary symptoms  -Denies symptoms of hypo or hyperglycemia  -Patient requests blood work today    BP Readings from Last 3 Encounters:   11/16/21 133/80   09/10/20 128/68   09/24/19 137/80      No Known Allergies    Past Medical & Surgical History:      Diagnosis Date    GERD (gastroesophageal reflux disease)     Over 5 years ago    Gout     several years    Impaired glucose tolerance 08/14/2018    Shoulder pain, left 07/2020    Vitiligo 6+ years arms, legs, left foot, neck, face     No past surgical history on file. Family History:      Problem Relation Age of Onset    No Known Problems Mother     Other Father         gout       Social History:  Social History     Tobacco Use    Smoking status: Never Smoker    Smokeless tobacco: Never Used   Substance Use Topics    Alcohol use: No         There is no immunization history on file for this patient. Internal Administration   First Dose      Second Dose           Last COVID Lab No results found for: SARS-COV-2, SARS-COV-2 RNA, SARS-COV-2, SARS-COV-2, SARS-COV-2 BY PCR, SARS-COV-2, SARS-COV-2, SARS-COV-2         Review of Systems:  Review of Systems   Constitutional: Negative for chills, fatigue and fever. HENT: Negative for congestion, postnasal drip, sinus pain, sneezing and sore throat. Eyes: Positive for redness (left eye). Negative for pain and visual disturbance. Respiratory: Negative for cough, chest tightness, shortness of breath and wheezing. Cardiovascular: Negative for chest pain, palpitations and leg swelling. Gastrointestinal: Negative for abdominal pain, nausea and vomiting. Genitourinary: Negative for frequency, hematuria and urgency. Musculoskeletal: Positive for arthralgias. Negative for joint swelling. Skin: Negative for rash and wound. Neurological: Negative for dizziness, syncope, light-headedness and numbness. Physical Exam:  VS:  /80   Pulse 71   Temp 97.5 °F (36.4 °C) (Temporal)   Ht 5' 6\" (1.676 m)   Wt 143 lb 9.6 oz (65.1 kg)   LMP 12/20/2016 (Approximate)   SpO2 99%   BMI 23.18 kg/m²     Physical Exam  Vitals and nursing note reviewed. Constitutional:       General: She is not in acute distress. Appearance: Normal appearance. She is not ill-appearing. HENT:      Head: Normocephalic and atraumatic. Right Ear: External ear normal.      Left Ear: External ear normal.      Nose: Nose normal. No congestion.       Mouth/Throat: Mouth: Mucous membranes are moist.      Pharynx: Oropharynx is clear. Eyes:      General:         Right eye: No discharge. Left eye: No discharge. Conjunctiva/sclera:      Left eye: Hemorrhage (healing) present. Cardiovascular:      Rate and Rhythm: Normal rate and regular rhythm. Heart sounds: No murmur heard. No friction rub. Pulmonary:      Effort: No respiratory distress. Chest:      Chest wall: No tenderness. Abdominal:      General: Abdomen is flat. Palpations: Abdomen is soft. There is no mass. Tenderness: There is no abdominal tenderness. Musculoskeletal:      Cervical back: Neck supple. No tenderness. Right lower leg: No edema. Left lower leg: No edema. Lymphadenopathy:      Cervical: No cervical adenopathy. Skin:     Findings: No erythema or rash. Neurological:      General: No focal deficit present. Mental Status: She is alert. Mental status is at baseline. Psychiatric:         Mood and Affect: Mood normal.         Thought Content: Thought content normal.         Assessment/Plan:  1. Acute non intractable tension-type headache  -Advised to regularly take execedrin/advil/ibuprofen/otc pain med for one week  -Advised to increase water intake  -Advised to have small frequent meals  -Advised to go to ED if symptoms worsen or change (slurred speech, changes in strength, worst headache of life, etc)  -Will F/U in 1 month to reassess    2. Subconjunctival hematoma, left  -Healing  -Advised patient to continue to monitor and told patient it is in healing phase and will heal on its own over time    3. Chronic left shoulder pain  -Patient initially wanted PT but unsure of cost (uninsured)  -Printed off shoulder exercised for home  -Advised to try rest, heat, or cold   -Will reassess in 1 month to see if exercises helped    4. Prediabetes  - LIPID PANEL; Future  - CBC WITH AUTO DIFFERENTIAL; Future  - BASIC METABOLIC PANEL;  Future  - HEMOGLOBIN A1C; episode of great toe pain. (Patient not taking: Reported on 11/16/2021) 9 tablet 1    cimetidine (TAGAMET) 800 MG tablet Take 1 tablet by mouth as needed (heartburn) (Patient not taking: Reported on 11/16/2021) 30 tablet 1     No current facility-administered medications for this visit. Alba Mitchell D.O.   Family Medicine   PGY-1

## 2022-05-12 ENCOUNTER — OFFICE VISIT (OUTPATIENT)
Dept: FAMILY MEDICINE CLINIC | Age: 57
End: 2022-05-12

## 2022-05-12 VITALS
BODY MASS INDEX: 23.11 KG/M2 | HEIGHT: 66 IN | OXYGEN SATURATION: 98 % | TEMPERATURE: 97.4 F | HEART RATE: 68 BPM | SYSTOLIC BLOOD PRESSURE: 131 MMHG | DIASTOLIC BLOOD PRESSURE: 69 MMHG | WEIGHT: 143.8 LBS

## 2022-05-12 DIAGNOSIS — R73.03 PREDIABETES: ICD-10-CM

## 2022-05-12 DIAGNOSIS — R03.0 ELEVATED BP WITHOUT DIAGNOSIS OF HYPERTENSION: ICD-10-CM

## 2022-05-12 DIAGNOSIS — E78.5 HYPERLIPIDEMIA, UNSPECIFIED HYPERLIPIDEMIA TYPE: ICD-10-CM

## 2022-05-12 DIAGNOSIS — Z12.31 ENCOUNTER FOR SCREENING MAMMOGRAM FOR MALIGNANT NEOPLASM OF BREAST: ICD-10-CM

## 2022-05-12 DIAGNOSIS — G44.229 CHRONIC TENSION-TYPE HEADACHE, NOT INTRACTABLE: ICD-10-CM

## 2022-05-12 LAB
ALBUMIN SERPL-MCNC: 4.9 G/DL (ref 3.5–5.2)
ALP BLD-CCNC: 60 U/L (ref 35–104)
ALT SERPL-CCNC: 16 U/L (ref 0–32)
ANION GAP SERPL CALCULATED.3IONS-SCNC: 14 MMOL/L (ref 7–16)
AST SERPL-CCNC: 23 U/L (ref 0–31)
BASOPHILS ABSOLUTE: 0.04 E9/L (ref 0–0.2)
BASOPHILS RELATIVE PERCENT: 0.8 % (ref 0–2)
BILIRUB SERPL-MCNC: 0.7 MG/DL (ref 0–1.2)
BUN BLDV-MCNC: 14 MG/DL (ref 6–20)
CALCIUM SERPL-MCNC: 9.9 MG/DL (ref 8.6–10.2)
CHLORIDE BLD-SCNC: 104 MMOL/L (ref 98–107)
CHOLESTEROL, TOTAL: 228 MG/DL (ref 0–199)
CO2: 23 MMOL/L (ref 22–29)
CREAT SERPL-MCNC: 0.9 MG/DL (ref 0.5–1)
EOSINOPHILS ABSOLUTE: 0.07 E9/L (ref 0.05–0.5)
EOSINOPHILS RELATIVE PERCENT: 1.4 % (ref 0–6)
GFR AFRICAN AMERICAN: >60
GFR NON-AFRICAN AMERICAN: >60 ML/MIN/1.73
GLUCOSE BLD-MCNC: 97 MG/DL (ref 74–99)
HBA1C MFR BLD: 6 % (ref 4–5.6)
HCT VFR BLD CALC: 42.8 % (ref 34–48)
HDLC SERPL-MCNC: 70 MG/DL
HEMOGLOBIN: 13.6 G/DL (ref 11.5–15.5)
IMMATURE GRANULOCYTES #: 0.01 E9/L
IMMATURE GRANULOCYTES %: 0.2 % (ref 0–5)
LDL CHOLESTEROL CALCULATED: 150 MG/DL (ref 0–99)
LYMPHOCYTES ABSOLUTE: 1.58 E9/L (ref 1.5–4)
LYMPHOCYTES RELATIVE PERCENT: 31.9 % (ref 20–42)
MCH RBC QN AUTO: 28.2 PG (ref 26–35)
MCHC RBC AUTO-ENTMCNC: 31.8 % (ref 32–34.5)
MCV RBC AUTO: 88.6 FL (ref 80–99.9)
MONOCYTES ABSOLUTE: 0.37 E9/L (ref 0.1–0.95)
MONOCYTES RELATIVE PERCENT: 7.5 % (ref 2–12)
NEUTROPHILS ABSOLUTE: 2.89 E9/L (ref 1.8–7.3)
NEUTROPHILS RELATIVE PERCENT: 58.2 % (ref 43–80)
PDW BLD-RTO: 13.3 FL (ref 11.5–15)
PLATELET # BLD: 237 E9/L (ref 130–450)
PMV BLD AUTO: 12.2 FL (ref 7–12)
POTASSIUM SERPL-SCNC: 4.8 MMOL/L (ref 3.5–5)
RBC # BLD: 4.83 E12/L (ref 3.5–5.5)
SODIUM BLD-SCNC: 141 MMOL/L (ref 132–146)
TOTAL PROTEIN: 7.6 G/DL (ref 6.4–8.3)
TRIGL SERPL-MCNC: 39 MG/DL (ref 0–149)
VLDLC SERPL CALC-MCNC: 8 MG/DL
WBC # BLD: 5 E9/L (ref 4.5–11.5)

## 2022-05-12 PROCEDURE — 99213 OFFICE O/P EST LOW 20 MIN: CPT | Performed by: FAMILY MEDICINE

## 2022-05-12 RX ORDER — NORTRIPTYLINE HYDROCHLORIDE 10 MG/1
10 CAPSULE ORAL NIGHTLY
Qty: 30 CAPSULE | Refills: 1 | Status: SHIPPED
Start: 2022-05-12 | End: 2022-08-10

## 2022-05-12 SDOH — ECONOMIC STABILITY: FOOD INSECURITY: WITHIN THE PAST 12 MONTHS, THE FOOD YOU BOUGHT JUST DIDN'T LAST AND YOU DIDN'T HAVE MONEY TO GET MORE.: NEVER TRUE

## 2022-05-12 SDOH — ECONOMIC STABILITY: FOOD INSECURITY: WITHIN THE PAST 12 MONTHS, YOU WORRIED THAT YOUR FOOD WOULD RUN OUT BEFORE YOU GOT MONEY TO BUY MORE.: NEVER TRUE

## 2022-05-12 ASSESSMENT — ENCOUNTER SYMPTOMS
DIARRHEA: 0
VOMITING: 0
TROUBLE SWALLOWING: 0
COUGH: 0
NAUSEA: 0
CONSTIPATION: 0
WHEEZING: 0
SHORTNESS OF BREATH: 0
SORE THROAT: 0

## 2022-05-12 ASSESSMENT — SOCIAL DETERMINANTS OF HEALTH (SDOH): HOW HARD IS IT FOR YOU TO PAY FOR THE VERY BASICS LIKE FOOD, HOUSING, MEDICAL CARE, AND HEATING?: NOT HARD AT ALL

## 2022-05-12 NOTE — PATIENT INSTRUCTIONS
When headaches start take Tylenol and Ibuprofen together    Start taking Nortriptyline nightly. In a few weeks ok to increase to 2 tablets.     Follow up with Dr. Rayne Skelton

## 2022-05-12 NOTE — PROGRESS NOTES
S: 64 y.o. female with   Chief Complaint   Patient presents with    Headache     for the last 7 to 10 days pt states that she has been having her b/p go up and down. Headaches have been off and on        Pt has been checking her BP at home and she had one elevated reading. She has also been having her headaches again. They have become more frequent. No aura. She has a hx of preDM. O: VS:  height is 5' 6\" (1.676 m) and weight is 143 lb 12.8 oz (65.2 kg). Her temporal temperature is 97.4 °F (36.3 °C). Her blood pressure is 131/69 and her pulse is 68. Her oxygen saturation is 98%. BP Readings from Last 3 Encounters:   05/12/22 131/69   11/16/21 133/80   09/10/20 128/68     See resident note      Impression/Plan:   1) headaches - start low dose nortriptyline. 2) blood pressure - pt reassured. 3) predM - labs ordered today  4) lipids - check labs today. 5) prev - mammo ordered. Health Maintenance Due   Topic Date Due    COVID-19 Vaccine (1) Never done    HIV screen  Never done    Hepatitis C screen  Never done    DTaP/Tdap/Td vaccine (1 - Tdap) Never done    Colorectal Cancer Screen  Never done    Shingles vaccine (1 of 2) Never done    Breast cancer screen  11/18/2018         Attending Physician Statement  I have discussed the case, including pertinent history and exam findings with the resident. I agree with the documented assessment and plan.       Jennifer Brown MD

## 2022-05-12 NOTE — PROGRESS NOTES
5/13/2022    Gilbert Youssef is a 64 y.o. female here for:    HPI:    Blood Pressure: Has had some elevated readings at home. No history of HTN. Has some headaches at baseline. And although the headaches have become more frequently they do not seem to be specifically tied to the blood pressure. She brought a list of home blood pressure readings all of which are in normal range except for one has an elevated systolic blood pressure up to 147. Blood pressure normal in office today. Headaches: Has been trialing OTC medications. This has been providing mild relief. She gets tension type headaches with a wrap around the side of her head mostly on the right side of the back of her head. She does do her best to stay hydrated. Not having typical aura. No significant neck pain with this. No vision changes or unilateral neurological problems. Prediabetes: Has not seen PCP since establishing with her. Wants to have labs for prediabetes and hyperlipidemia checked. She has been paying more attention to her diet lately. BP Readings from Last 3 Encounters:   05/12/22 131/69   11/16/21 133/80   09/10/20 128/68     Current Outpatient Medications   Medication Sig Dispense Refill    nortriptyline (PAMELOR) 10 MG capsule Take 1 capsule by mouth nightly 30 capsule 1    ibuprofen (ADVIL) 200 MG tablet Take 1 tablet by mouth every 6 hours as needed for Pain 30 tablet 1     No current facility-administered medications for this visit. No Known Allergies    Past Medical & Surgical History:      Diagnosis Date    GERD (gastroesophageal reflux disease)     Over 5 years ago    Gout     several years    Impaired glucose tolerance 08/14/2018    Shoulder pain, left 07/2020    Vitiligo 6+ years    arms, legs, left foot, neck, face     No past surgical history on file.     Family History:      Problem Relation Age of Onset    No Known Problems Mother     Other Father         gout       Social History:  Social History     Tobacco Use    Smoking status: Never Smoker    Smokeless tobacco: Never Used   Substance Use Topics    Alcohol use: No         There is no immunization history on file for this patient. Review of Systems   Constitutional: Negative for chills and fever. HENT: Negative for sore throat and trouble swallowing. Respiratory: Negative for cough, shortness of breath and wheezing. Cardiovascular: Negative for chest pain, palpitations and leg swelling. Gastrointestinal: Negative for constipation, diarrhea, nausea and vomiting. Genitourinary: Negative for dysuria and hematuria. Neurological: Negative for light-headedness and headaches. VS:  /69   Pulse 68   Temp 97.4 °F (36.3 °C) (Temporal)   Ht 5' 6\" (1.676 m)   Wt 143 lb 12.8 oz (65.2 kg)   LMP 12/20/2016 (Approximate)   SpO2 98%   BMI 23.21 kg/m²     Physical Exam  Vitals reviewed. Constitutional:       General: She is not in acute distress. Appearance: Normal appearance. She is not ill-appearing. HENT:      Head: Normocephalic and atraumatic. Cardiovascular:      Rate and Rhythm: Normal rate and regular rhythm. Pulses: Normal pulses. Heart sounds: Normal heart sounds. No murmur heard. Pulmonary:      Effort: Pulmonary effort is normal. No respiratory distress. Breath sounds: No wheezing, rhonchi or rales. Musculoskeletal:         General: Normal range of motion. Cervical back: Normal range of motion. No signs of trauma or crepitus. No pain with movement or muscular tenderness. Normal range of motion. Right lower leg: No edema. Left lower leg: No edema. Skin:     General: Skin is warm and dry. Neurological:      General: No focal deficit present. Mental Status: She is alert and oriented to person, place, and time. Cranial Nerves: No cranial nerve deficit. Sensory: No sensory deficit. Motor: No weakness.       Gait: Gait normal. Assessment/Plan:    1. Chronic tension-type headache, not intractable  Due to increasing frequency of tension type headaches we will trial daily medication  Start low-dose Pamelor, titrate up if minimal response to the 10 mg dose  Follow-up with PCP  - nortriptyline (PAMELOR) 10 MG capsule; Take 1 capsule by mouth nightly  Dispense: 30 capsule; Refill: 1    2. Elevated BP without diagnosis of hypertension  Counseled on blood pressure being normal in the office and all except for one of her home blood pressure readings being normal  No diagnosis of hypertension  Likely just isolated elevated readings counseled on transient elevation of blood pressure and low risk of this    3. Prediabetes  Recheck labs so that they can be reviewed by her PCP prior to next visit  - Hemoglobin A1C; Future  - LIPID PANEL; Future  - Comprehensive Metabolic Panel; Future  - CBC with Auto Differential; Future    4. Hyperlipidemia, unspecified hyperlipidemia type  Recheck lipid  - LIPID PANEL; Future    5. Encounter for screening mammogram for malignant neoplasm of breast  Due for mammogram we will order for her  - HAZEL DIGITAL SCREEN BILATERAL PER PROTOCOL; Future      Follow up: With PCP    Patient agrees with the above stated plan.       Mikael Angela MD  PGY-3 Family Medicine

## 2022-08-10 ENCOUNTER — OFFICE VISIT (OUTPATIENT)
Dept: FAMILY MEDICINE CLINIC | Age: 57
End: 2022-08-10

## 2022-08-10 VITALS
BODY MASS INDEX: 23.4 KG/M2 | TEMPERATURE: 97.8 F | SYSTOLIC BLOOD PRESSURE: 122 MMHG | OXYGEN SATURATION: 98 % | DIASTOLIC BLOOD PRESSURE: 66 MMHG | HEART RATE: 60 BPM | HEIGHT: 66 IN | WEIGHT: 145.6 LBS

## 2022-08-10 DIAGNOSIS — E78.00 ELEVATED CHOLESTEROL: ICD-10-CM

## 2022-08-10 DIAGNOSIS — G89.29 CHRONIC LEFT SHOULDER PAIN: Primary | ICD-10-CM

## 2022-08-10 DIAGNOSIS — Z59.89 DOES NOT HAVE HEALTH INSURANCE: ICD-10-CM

## 2022-08-10 DIAGNOSIS — M25.512 CHRONIC LEFT SHOULDER PAIN: Primary | ICD-10-CM

## 2022-08-10 DIAGNOSIS — R73.03 PREDIABETES: ICD-10-CM

## 2022-08-10 PROCEDURE — 99213 OFFICE O/P EST LOW 20 MIN: CPT | Performed by: FAMILY MEDICINE

## 2022-08-10 RX ORDER — NAPROXEN 250 MG/1
250 TABLET ORAL 2 TIMES DAILY WITH MEALS
Qty: 60 TABLET | Refills: 0 | Status: SHIPPED | OUTPATIENT
Start: 2022-08-10

## 2022-08-10 SDOH — ECONOMIC STABILITY - INCOME SECURITY: OTHER PROBLEMS RELATED TO HOUSING AND ECONOMIC CIRCUMSTANCES: Z59.89

## 2022-08-10 ASSESSMENT — PATIENT HEALTH QUESTIONNAIRE - PHQ9
SUM OF ALL RESPONSES TO PHQ QUESTIONS 1-9: 0
1. LITTLE INTEREST OR PLEASURE IN DOING THINGS: 0
SUM OF ALL RESPONSES TO PHQ QUESTIONS 1-9: 0
SUM OF ALL RESPONSES TO PHQ9 QUESTIONS 1 & 2: 0
SUM OF ALL RESPONSES TO PHQ QUESTIONS 1-9: 0
2. FEELING DOWN, DEPRESSED OR HOPELESS: 0
SUM OF ALL RESPONSES TO PHQ QUESTIONS 1-9: 0

## 2022-08-10 NOTE — PROGRESS NOTES
[facility]  Department of Family Medicine  Family Medicine Residency Program      Patient:  José العراقي 62 y.o. female  Date of Service: 8/10/22      Chief complaint:   Chief Complaint   Patient presents with    Shoulder Pain     Pt states that she has been having pain in her left shoulder for the last 6 months or so however it is getting worse. Pt can't lift her arm without pain      Patient requests daughter translate for her throughout the encounter and declines formal translation services. History ofPresent Illness   José العراقي is a 62 y.o. female who presents to the clinic with complaints as above.     Left arm/shoulder pain  For about 6 months  No acute trauma, has happened in the past  Stabbing pain, not all the time, sometimes radiates down the arm  Painful and limited ROM, difficult to lift above horizontal  Had something similar in the other shoulder, got medicine and PT and it resolved but still has limited strength in other shoulder  Denies fever, chills, headache, SOB, CP, belly pain    Prediabetes and Hypercholesterolemia  Hx of these  Not currently on a statin  Last A1C 6.0 on 5/12/22, discussed with patient; she is working on her diet and walking often  Last cholesterol panel on 5/12/22 with , Total 228, discussed with patient as well  The 10-year ASCVD risk score (Lennox Haywood., et al., 2013) is: 2%    Values used to calculate the score:      Age: 62 years      Sex: Female      Is Non- : No      Diabetic: No      Tobacco smoker: No      Systolic Blood Pressure: 619 mmHg      Is BP treated: No      HDL Cholesterol: 70 mg/dL      Total Cholesterol: 228 mg/dL   Does not require a statin at this time    Past Medical History:      Diagnosis Date    GERD (gastroesophageal reflux disease)     Over 5 years ago    Gout     several years    Impaired glucose tolerance 08/14/2018    Shoulder pain, left 07/2020    Vitiligo 6+ years    arms, legs, left foot, neck, face       Past Surgical History:    History reviewed. No pertinent surgical history. Allergies:    Patient has no known allergies. Social History:   Social History     Socioeconomic History    Marital status:      Spouse name: Not on file    Number of children: Not on file    Years of education: Not on file    Highest education level: Not on file   Occupational History    Not on file   Tobacco Use    Smoking status: Never    Smokeless tobacco: Never   Substance and Sexual Activity    Alcohol use: No    Drug use: No    Sexual activity: Yes     Partners: Male   Other Topics Concern    Not on file   Social History Narrative    Not on file     Social Determinants of Health     Financial Resource Strain: Low Risk     Difficulty of Paying Living Expenses: Not hard at all   Food Insecurity: No Food Insecurity    Worried About Running Out of Food in the Last Year: Never true    Ran Out of Food in the Last Year: Never true   Transportation Needs: Not on file   Physical Activity: Not on file   Stress: Not on file   Social Connections: Not on file   Intimate Partner Violence: Not on file   Housing Stability: Not on file        Family History:       Problem Relation Age of Onset    No Known Problems Mother     Other Father         gout       Medication List:    Current Outpatient Medications   Medication Sig Dispense Refill    naproxen (NAPROSYN) 250 MG tablet Take 1 tablet by mouth in the morning and 1 tablet in the evening. Take with meals. 60 tablet 0     No current facility-administered medications for this visit. Review of Systems:   Review of Systems as per HPI    Physical Exam   Vitals: /66   Pulse 60   Temp 97.8 °F (36.6 °C) (Temporal)   Ht 5' 6\" (1.676 m)   Wt 145 lb 9.6 oz (66 kg)   LMP 12/20/2016 (Approximate)   SpO2 98%   BMI 23.50 kg/m²   Physical Exam  Vitals and nursing note reviewed. Constitutional:       General: She is not in acute distress. Appearance: Normal appearance. HENT:      Head: Normocephalic and atraumatic. Eyes:      General:         Right eye: No discharge. Left eye: No discharge. Cardiovascular:      Rate and Rhythm: Normal rate and regular rhythm. Heart sounds: No murmur heard. Pulmonary:      Effort: Pulmonary effort is normal.      Breath sounds: Normal breath sounds. No wheezing. Abdominal:      General: Bowel sounds are normal.      Palpations: Abdomen is soft. Musculoskeletal:         General: No swelling, tenderness or deformity. Cervical back: Normal range of motion. No rigidity. Right lower leg: No edema. Left lower leg: No edema. Comments: Left shoulder unable to abduct past about 75 degrees, flexion about the same on both sides to about 45 degrees past horizontal.  Internal rotation preserved bilaterally. Strength 5/5 everywhere except left shoulder from horizontal with resistance which was about 4/5 limited due to pain. Areas not TTP until after active ROM. Impingement signs negative. Skin:     General: Skin is warm and dry. Neurological:      Mental Status: She is alert and oriented to person, place, and time. Mental status is at baseline. Assessment and Plan     1. Chronic left shoulder pain  Possible rotator cuff tear vs. Adhesive capsulitis  Trial naproxen, PT  FU about 6 weeks  - [facility], [address]  - naproxen (NAPROSYN) 250 MG tablet; Take 1 tablet by mouth in the morning and 1 tablet in the evening. Take with meals. Dispense: 60 tablet; Refill: 0    2. Prediabetes  A1C in May was 6.0  Continue work on diet and exercise  FU 3 months    3. Elevated cholesterol  Not yet a candidate for a statin, ASCVD risk 2%  FU at next well visit to discuss further    Counseled regarding above diagnosis, including possible risks and complications, especially if left uncontrolled.  Counseled regarding the possible side effects, risks, benefits and alternatives to treatment; patient and/or guardian verbalizes understanding, agrees, feels comfortable with, and wishes to proceed with above treatment plan. Call or go to ED immediately if symptoms worsen or persist. Advised patient to call with any new medication issues and, as applicable, read all Rx info from pharmacy to assure aware of all possible risks and side effects of medication before taking. Patient and/or guardian given opportunity to ask questions/raise concerns. The patient verbalized comfort and understanding of instructions. I encourage further reading and education about your health conditions. Information on many health conditions is provided by the American Academy of [employer]icians: https://familydoctor. org/  Please bring any questions to me at your next visit. Return to Office: Return in about 6 weeks (around 9/21/2022) for FU arm pain.     Vernita Baumgarten,

## 2022-08-10 NOTE — PROGRESS NOTES
S: 62 y.o. female with   Chief Complaint   Patient presents with    Shoulder Pain     Pt states that she has been having pain in her left shoulder for the last 6 months or so however it is getting worse. Pt can't lift her arm without pain        L shoulder pain, progressive over 6m. She has hx adhesive capsulitis contralateral side. No trauma, NSAIDS not helpful. O: VS:  height is 5' 6\" (1.676 m) and weight is 145 lb 9.6 oz (66 kg). Her temporal temperature is 97.8 °F (36.6 °C). Her blood pressure is 122/66 and her pulse is 60. Her oxygen saturation is 98%. BP Readings from Last 3 Encounters:   08/10/22 122/66   05/12/22 131/69   11/16/21 133/80     See resident note      Impression/Plan:   1) Shoulder Pain -probable rotator cuff strain - PT        Health Maintenance Due   Topic Date Due    COVID-19 Vaccine (1) Never done    HIV screen  Never done    Hepatitis C screen  Never done    DTaP/Tdap/Td vaccine (1 - Tdap) Never done    Colorectal Cancer Screen  Never done    Shingles vaccine (1 of 2) Never done    Breast cancer screen  11/18/2018         Attending Physician Statement  I have discussed the case, including pertinent history and exam findings with the resident. I agree with the documented assessment and plan.       Sharon Murray MD

## 2022-08-14 PROBLEM — Z59.89 DOES NOT HAVE HEALTH INSURANCE: Status: ACTIVE | Noted: 2022-08-14

## 2022-08-14 PROBLEM — Z59.71 DOES NOT HAVE HEALTH INSURANCE: Status: ACTIVE | Noted: 2022-08-14

## 2023-06-08 DIAGNOSIS — M25.512 CHRONIC LEFT SHOULDER PAIN: ICD-10-CM

## 2023-06-08 DIAGNOSIS — G89.29 CHRONIC LEFT SHOULDER PAIN: ICD-10-CM

## 2023-06-08 LAB
ANION GAP SERPL CALCULATED.3IONS-SCNC: 12 MMOL/L (ref 7–16)
BASOPHILS # BLD: 0.05 E9/L (ref 0–0.2)
BASOPHILS NFR BLD: 1.3 % (ref 0–2)
BUN SERPL-MCNC: 13 MG/DL (ref 6–20)
CALCIUM SERPL-MCNC: 9.9 MG/DL (ref 8.6–10.2)
CHLORIDE SERPL-SCNC: 104 MMOL/L (ref 98–107)
CHOLESTEROL, TOTAL: 212 MG/DL (ref 0–199)
CO2 SERPL-SCNC: 24 MMOL/L (ref 22–29)
CREAT SERPL-MCNC: 0.8 MG/DL (ref 0.5–1)
EOSINOPHIL # BLD: 0.09 E9/L (ref 0.05–0.5)
EOSINOPHIL NFR BLD: 2.4 % (ref 0–6)
ERYTHROCYTE [DISTWIDTH] IN BLOOD BY AUTOMATED COUNT: 13.2 FL (ref 11.5–15)
GLUCOSE SERPL-MCNC: 136 MG/DL (ref 74–99)
HBA1C MFR BLD: 6.4 % (ref 4–5.6)
HCT VFR BLD AUTO: 44.1 % (ref 34–48)
HDLC SERPL-MCNC: 65 MG/DL
HGB BLD-MCNC: 13.8 G/DL (ref 11.5–15.5)
IMM GRANULOCYTES # BLD: 0.01 E9/L
IMM GRANULOCYTES NFR BLD: 0.3 % (ref 0–5)
LDLC SERPL CALC-MCNC: 139 MG/DL (ref 0–99)
LYMPHOCYTES # BLD: 1.19 E9/L (ref 1.5–4)
LYMPHOCYTES NFR BLD: 31.7 % (ref 20–42)
MCH RBC QN AUTO: 28.3 PG (ref 26–35)
MCHC RBC AUTO-ENTMCNC: 31.3 % (ref 32–34.5)
MCV RBC AUTO: 90.4 FL (ref 80–99.9)
MONOCYTES # BLD: 0.25 E9/L (ref 0.1–0.95)
MONOCYTES NFR BLD: 6.7 % (ref 2–12)
NEUTROPHILS # BLD: 2.16 E9/L (ref 1.8–7.3)
NEUTS SEG NFR BLD: 57.6 % (ref 43–80)
PLATELET # BLD AUTO: 208 E9/L (ref 130–450)
PMV BLD AUTO: 12.8 FL (ref 7–12)
POTASSIUM SERPL-SCNC: 4.2 MMOL/L (ref 3.5–5)
RBC # BLD AUTO: 4.88 E12/L (ref 3.5–5.5)
SODIUM SERPL-SCNC: 140 MMOL/L (ref 132–146)
TRIGL SERPL-MCNC: 39 MG/DL (ref 0–149)
VLDLC SERPL CALC-MCNC: 8 MG/DL
WBC # BLD: 3.8 E9/L (ref 4.5–11.5)

## 2023-09-05 ENCOUNTER — OFFICE VISIT (OUTPATIENT)
Dept: FAMILY MEDICINE CLINIC | Age: 58
End: 2023-09-05
Payer: COMMERCIAL

## 2023-09-05 VITALS
WEIGHT: 141 LBS | HEIGHT: 67 IN | TEMPERATURE: 97.6 F | BODY MASS INDEX: 22.13 KG/M2 | RESPIRATION RATE: 18 BRPM | OXYGEN SATURATION: 98 % | HEART RATE: 87 BPM | SYSTOLIC BLOOD PRESSURE: 127 MMHG | DIASTOLIC BLOOD PRESSURE: 80 MMHG

## 2023-09-05 DIAGNOSIS — M25.512 CHRONIC LEFT SHOULDER PAIN: Primary | ICD-10-CM

## 2023-09-05 DIAGNOSIS — G89.29 CHRONIC LEFT SHOULDER PAIN: Primary | ICD-10-CM

## 2023-09-05 DIAGNOSIS — R73.03 PRE-DIABETES: ICD-10-CM

## 2023-09-05 PROCEDURE — 99213 OFFICE O/P EST LOW 20 MIN: CPT

## 2023-09-07 ASSESSMENT — ENCOUNTER SYMPTOMS
CHEST TIGHTNESS: 0
COUGH: 0
SORE THROAT: 0
ABDOMINAL PAIN: 0
DIARRHEA: 0
VOMITING: 0
RHINORRHEA: 0
NAUSEA: 0
EYE REDNESS: 0

## 2023-09-12 ENCOUNTER — HOSPITAL ENCOUNTER (OUTPATIENT)
Dept: GENERAL RADIOLOGY | Age: 58
Discharge: HOME | End: 2023-09-14
Payer: COMMERCIAL

## 2023-09-12 ENCOUNTER — HOSPITAL ENCOUNTER (OUTPATIENT)
Age: 58
Discharge: HOME | End: 2023-09-14
Payer: COMMERCIAL

## 2023-09-12 DIAGNOSIS — G89.29 CHRONIC LEFT SHOULDER PAIN: ICD-10-CM

## 2023-09-12 DIAGNOSIS — M25.512 CHRONIC LEFT SHOULDER PAIN: ICD-10-CM

## 2023-09-12 PROCEDURE — 73030 X-RAY EXAM OF SHOULDER: CPT

## 2023-09-18 ENCOUNTER — TELEPHONE (OUTPATIENT)
Dept: FAMILY MEDICINE CLINIC | Age: 58
End: 2023-09-18

## 2023-09-20 ENCOUNTER — TELEPHONE (OUTPATIENT)
Dept: FAMILY MEDICINE CLINIC | Age: 58
End: 2023-09-20

## 2023-09-20 NOTE — TELEPHONE ENCOUNTER
Patient notified of results with x-ray- but mom is having a lot of pain and having difficulty sleeping with it, what are her next steps since the x-ray was negative? Please call daughter back with information.

## 2023-09-29 ENCOUNTER — OFFICE VISIT (OUTPATIENT)
Dept: FAMILY MEDICINE CLINIC | Age: 58
End: 2023-09-29
Payer: COMMERCIAL

## 2023-09-29 VITALS
TEMPERATURE: 97.3 F | HEIGHT: 67 IN | HEART RATE: 86 BPM | BODY MASS INDEX: 21.5 KG/M2 | RESPIRATION RATE: 16 BRPM | WEIGHT: 137 LBS | DIASTOLIC BLOOD PRESSURE: 69 MMHG | OXYGEN SATURATION: 95 % | SYSTOLIC BLOOD PRESSURE: 116 MMHG

## 2023-09-29 VITALS
TEMPERATURE: 97.3 F | BODY MASS INDEX: 21.55 KG/M2 | SYSTOLIC BLOOD PRESSURE: 112 MMHG | DIASTOLIC BLOOD PRESSURE: 62 MMHG | HEART RATE: 88 BPM | WEIGHT: 137.6 LBS | OXYGEN SATURATION: 98 %

## 2023-09-29 DIAGNOSIS — R31.9 URINARY TRACT INFECTION WITH HEMATURIA, SITE UNSPECIFIED: Primary | ICD-10-CM

## 2023-09-29 DIAGNOSIS — R30.0 DYSURIA: ICD-10-CM

## 2023-09-29 DIAGNOSIS — N39.0 URINARY TRACT INFECTION WITH HEMATURIA, SITE UNSPECIFIED: Primary | ICD-10-CM

## 2023-09-29 DIAGNOSIS — M75.02 ADHESIVE CAPSULITIS OF LEFT SHOULDER: Primary | ICD-10-CM

## 2023-09-29 LAB
BILIRUBIN, POC: NORMAL
BLOOD URINE, POC: NORMAL
CLARITY, POC: NORMAL
COLOR, POC: YELLOW
GLUCOSE URINE, POC: NORMAL
KETONES, POC: NORMAL
LEUKOCYTE EST, POC: NORMAL
NITRITE, POC: POSITIVE
PH, POC: 5.5
PROTEIN, POC: NORMAL
SPECIFIC GRAVITY, POC: >=1.03
UROBILINOGEN, POC: 0.2

## 2023-09-29 PROCEDURE — 99213 OFFICE O/P EST LOW 20 MIN: CPT

## 2023-09-29 PROCEDURE — 81002 URINALYSIS NONAUTO W/O SCOPE: CPT | Performed by: PHYSICIAN ASSISTANT

## 2023-09-29 PROCEDURE — 99203 OFFICE O/P NEW LOW 30 MIN: CPT | Performed by: PHYSICIAN ASSISTANT

## 2023-09-29 RX ORDER — LIDOCAINE HYDROCHLORIDE 20 MG/ML
INJECTION, SOLUTION EPIDURAL; INFILTRATION; INTRACAUDAL; PERINEURAL ONCE
Status: CANCELLED | OUTPATIENT
Start: 2023-09-29 | End: 2023-09-29

## 2023-09-29 RX ORDER — CEFDINIR 300 MG/1
300 CAPSULE ORAL 2 TIMES DAILY
Qty: 20 CAPSULE | Refills: 0 | Status: SHIPPED | OUTPATIENT
Start: 2023-09-29 | End: 2023-10-09

## 2023-09-29 RX ORDER — TRIAMCINOLONE ACETONIDE 40 MG/ML
40 INJECTION, SUSPENSION INTRA-ARTICULAR; INTRAMUSCULAR ONCE
Status: COMPLETED | OUTPATIENT
Start: 2023-09-29 | End: 2023-09-29

## 2023-09-29 RX ORDER — LIDOCAINE HYDROCHLORIDE 10 MG/ML
4 INJECTION, SOLUTION EPIDURAL; INFILTRATION; INTRACAUDAL; PERINEURAL ONCE
Status: COMPLETED | OUTPATIENT
Start: 2023-09-29 | End: 2023-09-29

## 2023-09-29 RX ADMIN — TRIAMCINOLONE ACETONIDE 40 MG: 40 INJECTION, SUSPENSION INTRA-ARTICULAR; INTRAMUSCULAR at 16:18

## 2023-09-29 RX ADMIN — LIDOCAINE HYDROCHLORIDE 4 ML: 10 INJECTION, SOLUTION EPIDURAL; INFILTRATION; INTRACAUDAL; PERINEURAL at 16:15

## 2023-09-29 NOTE — PROGRESS NOTES
[facility]  Department of Family Medicine  Family Medicine Residency Program      Patient: Torito Felder 62 y.o. female     Date of Service: 9/29/23      Chief complaint:   Chief Complaint   Patient presents with    Joint Pain     Left shoulder injection    Urinary Tract Infection     Burning, stomach ache       HISTORY OF PRESENTING ILLNESS     62 y.o. female presented to the clinic     Left shoulder pain x 2-3 years  Overusing due to   Worse with Abduction  Active ROM limited difficult to get above head now. Left shoulder xray\" No acute abnormality. Did not do   She works at [employer] and does a lot of work in the back, opening boxes, lifting boxes. She has been using Voltaren gel and Ibuprofen but it has not been helping. Health Maintenance:  Health Maintenance Due   Topic Date Due    Hepatitis B vaccine (1 of 3 - 3-dose series) Never done    HIV screen  Never done    Hepatitis C screen  Never done    DTaP/Tdap/Td vaccine (1 - Tdap) Never done    Colorectal Cancer Screen  Never done    Shingles vaccine (1 of 2) Never done    Breast cancer screen  11/18/2018    COVID-19 Vaccine (4 - Moderna series) 03/04/2022    Flu vaccine (1) Never done     Past Medical History:      Diagnosis Date    GERD (gastroesophageal reflux disease)     Over 5 years ago    Gout     several years    Impaired glucose tolerance 08/14/2018    Shoulder pain, left 07/2020    Vitiligo 6+ years    arms, legs, left foot, neck, face     Past Surgical History:    No past surgical history on file. Allergies:    Patient has no known allergies.   Social History:   Social History     Socioeconomic History    Marital status:      Spouse name: Not on file    Number of children: Not on file    Years of education: Not on file    Highest education level: Not on file   Occupational History    Not on file   Tobacco Use    Smoking status: Never    Smokeless tobacco: Never   Substance and Sexual Activity    Alcohol

## 2023-09-29 NOTE — PROGRESS NOTES
23  Hadley Alvarado : 1965 Sex: female  Age 62 y.o. Subjective:  Chief Complaint   Patient presents with    Dysuria     X3 days    Abdominal Pain         HPI:   HPI  Hadley Alvarado , 62 y.o. female presents to express care for evaluation of urinary tract infection. The patient states that she believes that she has a UTI. She does have some pressure in the suprapubic area of the stomach. The patient has noted these  symptoms for the last 3 days. The patient states that she has not any fever, chills. No vaginal bleeding or vaginal discharge. The patient has frequency, urgency. The patient is not currently on any antibiotics. The patient also has an appointment later today to have injections in her left shoulder. ROS:   Unless otherwise stated in this report the patient's positive and negative responses for review of systems for constitutional, eyes, ENT, cardiovascular, respiratory, gastrointestinal, neurological, , musculoskeletal, and integument systems and related systems to the presenting problem are either stated in the history of present illness or were not pertinent or were negative for the symptoms and/or complaints related to the presenting medical problem. Positives and pertinent negatives as per HPI. All others reviewed and are negative. PMH:     Past Medical History:   Diagnosis Date    GERD (gastroesophageal reflux disease)     Over 5 years ago    Gout     several years    Impaired glucose tolerance 2018    Shoulder pain, left 2020    Vitiligo 6+ years    arms, legs, left foot, neck, face       History reviewed. No pertinent surgical history.     Family History   Problem Relation Age of Onset    No Known Problems Mother     Other Father         gout       Medications:     Current Outpatient Medications:     cefdinir (OMNICEF) 300 MG capsule, Take 1 capsule by mouth 2 times daily for 10 days, Disp: 20 capsule, Rfl: 0    naproxen (NAPROSYN) 250 MG tablet,

## 2023-10-02 LAB
CULTURE: ABNORMAL
SPECIMEN DESCRIPTION: ABNORMAL

## 2023-10-08 ASSESSMENT — ENCOUNTER SYMPTOMS
DIARRHEA: 0
ABDOMINAL PAIN: 0
VOMITING: 0
CHEST TIGHTNESS: 0
COUGH: 0
NAUSEA: 0
SHORTNESS OF BREATH: 0

## 2023-11-03 ENCOUNTER — HOSPITAL ENCOUNTER (OUTPATIENT)
Dept: PHYSICAL THERAPY | Age: 58
Setting detail: THERAPIES SERIES
Discharge: HOME | End: 2023-11-03
Payer: COMMERCIAL

## 2023-11-03 PROCEDURE — 97161 PT EVAL LOW COMPLEX 20 MIN: CPT | Performed by: PHYSICAL THERAPIST

## 2023-11-03 ASSESSMENT — PAIN DESCRIPTION - ORIENTATION: ORIENTATION: LEFT

## 2023-11-03 ASSESSMENT — PAIN DESCRIPTION - DESCRIPTORS: DESCRIPTORS: SHARP;TIGHTNESS

## 2023-11-03 ASSESSMENT — PAIN SCALES - GENERAL: PAINLEVEL_OUTOF10: 4

## 2023-11-03 ASSESSMENT — PAIN DESCRIPTION - LOCATION: LOCATION: SHOULDER

## 2023-11-03 NOTE — PROGRESS NOTES
Physical Therapy    Physical Therapy: Initial Evaluation    Patient: Govind Ford (59 y.o. female)   Examination Date:   Plan of Care Certification Period: 11/3/2023 to        :  1965 ;    Confirmed: Yes MRN: [id number]  CSN: [id number]   Insurance: Payor: Ashley Huffman / Plan: BCBS - [place] PPO / Product Type: *No Product type* /   Insurance ID: [id number] - ([facility]) Secondary Insurance (if applicable):    Referring Physician: Court Perez*     PCP: Minh Marte MD Visits to Date/Visits Approved:   /      No Show/Cancelled Appts:   /       Medical Diagnosis: Adhesive capsulitis of left shoulder [M75.02]    Treatment Diagnosis:       PERTINENT MEDICAL HISTORY           Medical History: Chart Reviewed: Yes   Past Medical History:   Diagnosis Date    GERD (gastroesophageal reflux disease)     Over 5 years ago    Gout     several years    Impaired glucose tolerance 2018    Shoulder pain, left 2020    Vitiligo 6+ years    arms, legs, left foot, neck, face     Surgical History: No past surgical history on file. Medications:   Current Outpatient Medications:     naproxen (NAPROSYN) 250 MG tablet, Take 2 tablets by mouth 2 times daily (with meals) (Patient not taking: Reported on 2023), Disp: 60 tablet, Rfl: 2  Allergies: Patient has no known allergies. SUBJECTIVE EXAMINATION      ,           Subjective History:    Subjective: Patient presents to PT with c/o L shoulder pain/tightness. Reports symptoms began approx 1 year ago- insidious onset. She reports pain hinders sleep at times. Patient states the shoulder has limited ROM/strength making her rely on RUE. Pateint is R hand dominant however has been used to using LUE due to similiar event happening to R shoulder approx 10 years ago. Those symptoms have subsided. Injections given approx 2 months ago with minimal relief. She has attempted some exercises again with miniaml success.  She currently takes advil prn for symptom

## 2023-12-11 ENCOUNTER — OFFICE VISIT (OUTPATIENT)
Dept: FAMILY MEDICINE CLINIC | Age: 58
End: 2023-12-11
Payer: COMMERCIAL

## 2023-12-11 VITALS
RESPIRATION RATE: 18 BRPM | TEMPERATURE: 97.2 F | OXYGEN SATURATION: 100 % | HEIGHT: 67 IN | WEIGHT: 143 LBS | BODY MASS INDEX: 22.44 KG/M2 | DIASTOLIC BLOOD PRESSURE: 62 MMHG | SYSTOLIC BLOOD PRESSURE: 98 MMHG | HEART RATE: 83 BPM

## 2023-12-11 DIAGNOSIS — E78.00 ELEVATED CHOLESTEROL: ICD-10-CM

## 2023-12-11 DIAGNOSIS — M75.02 ADHESIVE CAPSULITIS OF LEFT SHOULDER: Primary | ICD-10-CM

## 2023-12-11 DIAGNOSIS — R73.03 PREDIABETES: ICD-10-CM

## 2023-12-11 LAB
CHOLESTEROL: 238 MG/DL
HBA1C MFR BLD: 6.4 %
HDLC SERPL-MCNC: 75 MG/DL
LDL CHOLESTEROL: 151 MG/DL
TRIGL SERPL-MCNC: 62 MG/DL
VLDLC SERPL CALC-MCNC: 12 MG/DL

## 2023-12-11 PROCEDURE — 99213 OFFICE O/P EST LOW 20 MIN: CPT

## 2023-12-11 PROCEDURE — 83036 HEMOGLOBIN GLYCOSYLATED A1C: CPT

## 2023-12-11 ASSESSMENT — ENCOUNTER SYMPTOMS
COUGH: 0
DIARRHEA: 0
VOMITING: 0
NAUSEA: 0
SHORTNESS OF BREATH: 0
ABDOMINAL PAIN: 0
CHEST TIGHTNESS: 0

## 2024-10-11 ENCOUNTER — HOSPITAL ENCOUNTER (OUTPATIENT)
Age: 59
Discharge: HOME | End: 2024-10-11
Payer: COMMERCIAL

## 2024-10-11 ENCOUNTER — OFFICE VISIT (OUTPATIENT)
Dept: FAMILY MEDICINE CLINIC | Age: 59
End: 2024-10-11

## 2024-10-11 VITALS
RESPIRATION RATE: 16 BRPM | WEIGHT: 148 LBS | DIASTOLIC BLOOD PRESSURE: 79 MMHG | TEMPERATURE: 97.4 F | SYSTOLIC BLOOD PRESSURE: 138 MMHG | HEIGHT: 67 IN | BODY MASS INDEX: 23.23 KG/M2 | HEART RATE: 64 BPM | OXYGEN SATURATION: 98 %

## 2024-10-11 DIAGNOSIS — H92.02 OTALGIA OF LEFT EAR: ICD-10-CM

## 2024-10-11 DIAGNOSIS — E78.00 ELEVATED CHOLESTEROL: ICD-10-CM

## 2024-10-11 DIAGNOSIS — Z00.00 ANNUAL PHYSICAL EXAM: ICD-10-CM

## 2024-10-11 DIAGNOSIS — R73.03 PREDIABETES: Primary | ICD-10-CM

## 2024-10-11 DIAGNOSIS — L30.9 DERMATITIS: ICD-10-CM

## 2024-10-11 LAB
ALBUMIN SERPL-MCNC: 4.5 G/DL (ref 3.5–5.2)
ALP SERPL-CCNC: 85 U/L (ref 35–104)
ALT SERPL-CCNC: 15 U/L (ref 0–32)
ANION GAP SERPL CALCULATED.3IONS-SCNC: 8 MMOL/L (ref 7–16)
AST SERPL-CCNC: 20 U/L (ref 0–31)
BILIRUB SERPL-MCNC: 0.6 MG/DL (ref 0–1.2)
BUN SERPL-MCNC: 10 MG/DL (ref 6–20)
CALCIUM SERPL-MCNC: 9.5 MG/DL (ref 8.6–10.2)
CHLORIDE SERPL-SCNC: 105 MMOL/L (ref 98–107)
CHOLEST SERPL-MCNC: 240 MG/DL
CO2 SERPL-SCNC: 27 MMOL/L (ref 22–29)
CREAT SERPL-MCNC: 0.8 MG/DL (ref 0.5–1)
ERYTHROCYTE [DISTWIDTH] IN BLOOD BY AUTOMATED COUNT: 13.2 % (ref 11.5–15)
GFR, ESTIMATED: >90 ML/MIN/1.73M2
GLUCOSE SERPL-MCNC: 122 MG/DL (ref 74–99)
HBA1C MFR BLD: 7 % (ref 4–5.6)
HCT VFR BLD AUTO: 44.4 % (ref 34–48)
HDLC SERPL-MCNC: 73 MG/DL
HGB BLD-MCNC: 14 G/DL (ref 11.5–15.5)
LDLC SERPL CALC-MCNC: 157 MG/DL
MCH RBC QN AUTO: 28.3 PG (ref 26–35)
MCHC RBC AUTO-ENTMCNC: 31.5 G/DL (ref 32–34.5)
MCV RBC AUTO: 89.9 FL (ref 80–99.9)
PLATELET # BLD AUTO: 236 K/UL (ref 130–450)
PMV BLD AUTO: 11.7 FL (ref 7–12)
POTASSIUM SERPL-SCNC: 4.6 MMOL/L (ref 3.5–5)
PROT SERPL-MCNC: 7.4 G/DL (ref 6.4–8.3)
RBC # BLD AUTO: 4.94 M/UL (ref 3.5–5.5)
SODIUM SERPL-SCNC: 140 MMOL/L (ref 132–146)
TRIGL SERPL-MCNC: 48 MG/DL
VLDLC SERPL CALC-MCNC: 10 MG/DL
WBC OTHER # BLD: 4.8 K/UL (ref 4.5–11.5)

## 2024-10-11 PROCEDURE — 80061 LIPID PANEL: CPT

## 2024-10-11 PROCEDURE — 36415 COLL VENOUS BLD VENIPUNCTURE: CPT

## 2024-10-11 PROCEDURE — 83036 HEMOGLOBIN GLYCOSYLATED A1C: CPT

## 2024-10-11 PROCEDURE — 86803 HEPATITIS C AB TEST: CPT

## 2024-10-11 PROCEDURE — 80053 COMPREHEN METABOLIC PANEL: CPT

## 2024-10-11 PROCEDURE — 85027 COMPLETE CBC AUTOMATED: CPT

## 2024-10-11 PROCEDURE — 87389 HIV-1 AG W/HIV-1&-2 AB AG IA: CPT

## 2024-10-11 RX ORDER — TRIAMCINOLONE ACETONIDE 1 MG/G
CREAM TOPICAL
Qty: 45 G | Refills: 0 | Status: SHIPPED | OUTPATIENT
Start: 2024-10-11

## 2024-10-11 SDOH — ECONOMIC STABILITY: FOOD INSECURITY: WITHIN THE PAST 12 MONTHS, YOU WORRIED THAT YOUR FOOD WOULD RUN OUT BEFORE YOU GOT MONEY TO BUY MORE.: NEVER TRUE

## 2024-10-11 SDOH — ECONOMIC STABILITY: FOOD INSECURITY: WITHIN THE PAST 12 MONTHS, THE FOOD YOU BOUGHT JUST DIDN'T LAST AND YOU DIDN'T HAVE MONEY TO GET MORE.: NEVER TRUE

## 2024-10-11 SDOH — ECONOMIC STABILITY: INCOME INSECURITY: HOW HARD IS IT FOR YOU TO PAY FOR THE VERY BASICS LIKE FOOD, HOUSING, MEDICAL CARE, AND HEATING?: NOT HARD AT ALL

## 2024-10-11 ASSESSMENT — PATIENT HEALTH QUESTIONNAIRE - PHQ9
SUM OF ALL RESPONSES TO PHQ QUESTIONS 1-9: 0
1. LITTLE INTEREST OR PLEASURE IN DOING THINGS: NOT AT ALL
SUM OF ALL RESPONSES TO PHQ QUESTIONS 1-9: 0
2. FEELING DOWN, DEPRESSED OR HOPELESS: NOT AT ALL
SUM OF ALL RESPONSES TO PHQ9 QUESTIONS 1 & 2: 0

## 2024-10-11 ASSESSMENT — ENCOUNTER SYMPTOMS
SHORTNESS OF BREATH: 0
COUGH: 0
CONSTIPATION: 0
NAUSEA: 0
DIARRHEA: 0
CHEST TIGHTNESS: 0
ABDOMINAL PAIN: 0
VOMITING: 0

## 2024-10-14 LAB
HCV AB SERPL QL IA: NONREACTIVE
HIV 1+2 AB+HIV1 P24 AG SERPL QL IA: NONREACTIVE

## 2024-10-17 ENCOUNTER — TELEPHONE (OUTPATIENT)
Dept: FAMILY MEDICINE CLINIC | Age: 59
End: 2024-10-17

## 2024-10-17 NOTE — TELEPHONE ENCOUNTER
Patient daughter calling about lab results. Spoke to her about labs. They are interested in starting a diet low in carbohydrates and sugar and see if that help. She also would like a call or mychart about what else to do to help. Thank you

## 2024-10-18 NOTE — TELEPHONE ENCOUNTER
Called patient to discuss lab results with her, discussed with dietary modification at this point regarding her cholesterol as well as prediabetic range.  Patient's A1c is 7.0 at this moment.  Gave options about metformin and diet modifications.  Patient opted to go with diet modification at this time and we will check the levels in 3 months and decide about medication at that time.    Answered all the question and concerns from patient    .Jada Franco. MD.   PGY3, FM.      Attending Only

## 2024-10-29 ENCOUNTER — TELEPHONE (OUTPATIENT)
Dept: FAMILY MEDICINE CLINIC | Age: 59
End: 2024-10-29

## 2024-10-29 NOTE — TELEPHONE ENCOUNTER
[employer] calling to obtain an appropriate diagnosis code for Cologuard ordered 10/11/2024. The diagnosis given was Z00.00 (Annual Physical Exam) is not covered by insurance. Provided verbal order for Z12.11 (Screening for Colon Cancer). They will ship the test kit to patients home address once processed.

## 2024-10-30 DIAGNOSIS — Z12.11 ENCOUNTER FOR COLORECTAL CANCER SCREENING: Primary | ICD-10-CM

## 2024-10-30 DIAGNOSIS — Z12.12 ENCOUNTER FOR COLORECTAL CANCER SCREENING: Primary | ICD-10-CM

## 2024-11-14 ENCOUNTER — HOSPITAL ENCOUNTER (OUTPATIENT)
Dept: MAMMOGRAPHY | Age: 59
Discharge: HOME | End: 2024-11-16
Payer: COMMERCIAL

## 2024-11-14 VITALS — WEIGHT: 150 LBS | BODY MASS INDEX: 24.11 KG/M2 | HEIGHT: 66 IN

## 2024-11-14 DIAGNOSIS — Z00.00 ANNUAL PHYSICAL EXAM: ICD-10-CM

## 2024-11-14 PROCEDURE — 77063 BREAST TOMOSYNTHESIS BI: CPT

## 2024-11-22 LAB — NONINV COLON CA DNA+OCC BLD SCRN STL QL: NEGATIVE

## 2025-01-10 ENCOUNTER — TELEPHONE (OUTPATIENT)
Dept: FAMILY MEDICINE CLINIC | Age: 60
End: 2025-01-10

## 2025-01-10 NOTE — TELEPHONE ENCOUNTER
----- Message from Xenia MATOS sent at 1/10/2025  3:22 PM EST -----  LVM for pt to call and schedule appt asap.  ----- Message -----  From: Jada Franco MD  Sent: 1/10/2025   2:54 PM EST  To: Kelsey Simmonds    Prediabetes.  ----- Message -----  From: Simmonds, Kelsey  Sent: 1/10/2025   1:54 PM EST  To: Jada Franco MD; [employer]     Her last office visit said return in one year for physical. What is she coming in for?     Return in about 1 year (around 10/11/2025) for annual physical.  ----- Message -----  From: Jada Franco MD  Sent: 1/10/2025   1:52 PM EST  To: [employer]     Please make appointment for follow up.